# Patient Record
Sex: FEMALE | Race: BLACK OR AFRICAN AMERICAN | Employment: OTHER | URBAN - METROPOLITAN AREA
[De-identification: names, ages, dates, MRNs, and addresses within clinical notes are randomized per-mention and may not be internally consistent; named-entity substitution may affect disease eponyms.]

---

## 2017-01-04 ENCOUNTER — HOSPITAL ENCOUNTER (OUTPATIENT)
Dept: PHYSICAL THERAPY | Age: 62
Discharge: HOME | End: 2017-01-04
Payer: COMMERCIAL

## 2017-01-04 PROCEDURE — G8979 MOBILITY GOAL STATUS: HCPCS

## 2017-01-04 PROCEDURE — 97110 THERAPEUTIC EXERCISES: CPT

## 2017-01-04 PROCEDURE — G8980 MOBILITY D/C STATUS: HCPCS

## 2017-01-04 PROCEDURE — 97112 NEUROMUSCULAR REEDUCATION: CPT
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
## 2017-01-04 NOTE — PROGRESS NOTES
PT DAILY TREATMENT NOTE - [facility]     Patient Name: Tamia Parr  Date:2017  : 1955  [x]  Patient  Verified  Payor: BLUE CROSS / Plan: Lee Guillen [id number] PPO / Product Type: PPO /    In time:3:10  Out time:3:50  Total Treatment Time (min): 40  Total Timed Codes (min): 40  1:1 Treatment Time (MC only): 40   Visit #: 5 of 5    Treatment Area: Lumbar pain [M54.5]  Neck pain [M54.2]  Shoulder pain [M25.519]    SUBJECTIVE  Pain Level (0-10 scale): 0  Any medication changes, allergies to medications, adverse drug reactions, diagnosis change, or new procedure performed?: [x] No    [] Yes (see summary sheet for update)  Subjective functional status/changes:   [] No changes reported  Pt reports no current pain and that she is ready to finish up with PT today.      OBJECTIVE    Modality rationale:  to improve the patients ability to    Min Type Additional Details    [] Estim:  []Unatt       []IFC  []Premod                        []Other:  []w/ice   []w/heat  Position:  Location:    [] Estim: []Att    []TENS instruct  []NMES                    []Other:  []w/US   []w/ice   []w/heat  Position:  Location:    []  Traction: [] Cervical       []Lumbar                       [] Prone          []Supine                       []Intermittent   []Continuous Lbs:  [] before manual  [] after manual    []  Ultrasound: []Continuous   [] Pulsed                           []1MHz   []3MHz W/cm2:  Location:    []  Iontophoresis with dexamethasone         Location: [] Take home patch   [] In clinic    []  Ice     []  heat  []  Ice massage  []  Laser   []  Anodyne Position:  Location:    []  Laser with stim  []  Other:  Position:  Location:    []  Vasopneumatic Device Pressure:       [] lo [] med [] hi   Temperature: [] lo [] med [] hi   [] Skin assessment post-treatment:  []intact []redness- no adverse reaction    []redness  adverse reaction:      min []Eval                  []Re-Eval       30 min Therapeutic Exercise:  [x] See flow sheet :   Rationale: increase ROM and increase strength to improve the patients ability to improve activity tolerance      min Therapeutic Activity:  []  See flow sheet :   Rationale:   to improve the patients ability to      10 min Neuromuscular Re-education:  [x]  See flow sheet : core act and scap stab ex's   Rationale: increase strength and improve coordination  to improve the patients ability to improve activity tolerance     min Manual Therapy:     Rationale:  to      min Gait Training:  ___ feet with ___ device on level surfaces with ___ level of assist   Rationale: With   [] TE   [] TA   [] neuro   [] other: Patient Education: [x] Review HEP    [] Progressed/Changed HEP based on:   [] positioning   [] body mechanics   [] transfers   [] heat/ice application    [] other:      Other Objective/Functional Measures: see goals below     Pain Level (0-10 scale) post treatment: 0    ASSESSMENT/Changes in Function: D/C due to progress toward goals     []  See Plan of Care  []  See progress note/recertification  [x]  See Discharge Summary         Progress towards goals / Updated goals:  Long term goals: to be accomplished in 6 weeks  1) Pt will improve Back FOTO > or = 60 indicating improvements in function. At PN: Progressing, 44, an increase of 3. 10/13/16  Current: Goal met- Back FOTO = 66 (+25 since I.E.) (1/4/17)  2) Pt will improve C/S AROM B SB, B rot > or = 8-10 deg w/o pain for ease with driving. At PN: R SB 45 without pain, L SB 41 with slight pain, L rot 60 without pain, R rot 59 without pain.    Current:Goal met: Pt is WNL with B SB and B Rot with no increase in pain. (1/4/17)  3) Pt will improve L/S AROM flex, ext, B SB WFL w/o an increase in pain in order to perform ADL's. At PN: Flexion WNL with no pain, ext WNL without pain, B SB WNL with increase in pain to L SB, B Rot WNL.  10/17/16  Current: Goal met- L/S AROM flex, ext, B SB WNL w/o an increase in pain (1/4/17)    PLAN  [] Upgrade activities as tolerated     []  Continue plan of care  []  Update interventions per flow sheet       [x]  Discharge due to: Progress toward goals  []  Other:_      Yunier Randolph PT 1/4/2017  3:15 PM    Future Appointments  Date Time Provider Jody Lara   2/1/2017 10:15 AM Nia Parra  [address]

## 2017-02-01 ENCOUNTER — OFFICE VISIT (OUTPATIENT)
Dept: ORTHOPEDIC SURGERY | Age: 62
End: 2017-02-01

## 2017-02-01 VITALS
HEIGHT: 65 IN | DIASTOLIC BLOOD PRESSURE: 72 MMHG | TEMPERATURE: 97.8 F | OXYGEN SATURATION: 98 % | WEIGHT: 197.6 LBS | RESPIRATION RATE: 18 BRPM | SYSTOLIC BLOOD PRESSURE: 154 MMHG | HEART RATE: 102 BPM | BODY MASS INDEX: 32.92 KG/M2

## 2017-02-01 DIAGNOSIS — M48.02 CERVICAL STENOSIS OF SPINE: Primary | ICD-10-CM

## 2017-02-01 RX ORDER — BETAMETHASONE SODIUM PHOSPHATE AND BETAMETHASONE ACETATE 3; 3 MG/ML; MG/ML
12 INJECTION, SUSPENSION INTRA-ARTICULAR; INTRALESIONAL; INTRAMUSCULAR; SOFT TISSUE ONCE
Qty: 2 ML | Refills: 0
Start: 2017-02-01 | End: 2017-02-01

## 2017-02-01 RX ORDER — LOSARTAN POTASSIUM AND HYDROCHLOROTHIAZIDE 12.5; 1 MG/1; MG/1
TABLET ORAL
Refills: 5 | COMMUNITY
Start: 2017-01-01 | End: 2017-02-01

## 2017-02-01 RX ORDER — LIDOCAINE HYDROCHLORIDE 20 MG/ML
0.5 INJECTION, SOLUTION EPIDURAL; INFILTRATION; INTRACAUDAL; PERINEURAL ONCE
Qty: 0.5 ML | Refills: 0
Start: 2017-02-01 | End: 2017-02-01

## 2017-02-01 RX ORDER — HYDROCODONE BITARTRATE AND ACETAMINOPHEN 5; 325 MG/1; MG/1
TABLET ORAL
Refills: 0 | COMMUNITY
Start: 2017-01-25 | End: 2018-10-25 | Stop reason: ALTCHOICE

## 2017-02-01 RX ORDER — PREGABALIN 100 MG/1
100 CAPSULE ORAL 3 TIMES DAILY
Qty: 90 CAP | Refills: 2 | Status: SHIPPED | OUTPATIENT
Start: 2017-02-01

## 2017-02-01 RX ORDER — DEXLANSOPRAZOLE 30 MG/1
CAPSULE, DELAYED RELEASE ORAL
Refills: 12 | COMMUNITY
Start: 2016-12-01 | End: 2019-10-07

## 2017-02-01 RX ORDER — BUPIVACAINE HYDROCHLORIDE 2.5 MG/ML
0.5 INJECTION, SOLUTION INFILTRATION; PERINEURAL ONCE
Qty: 0.5 ML | Refills: 0
Start: 2017-02-01 | End: 2017-02-01

## 2017-02-01 RX ORDER — AZELASTINE HCL 205.5 UG/1
SPRAY NASAL
Refills: 12 | COMMUNITY
Start: 2016-10-27 | End: 2020-10-22 | Stop reason: ALTCHOICE

## 2017-02-01 RX ORDER — SODIUM CHLORIDE 0.9 G/100ML
IRRIGANT IRRIGATION
Refills: 0 | COMMUNITY
Start: 2016-11-11 | End: 2018-10-25 | Stop reason: ALTCHOICE

## 2017-02-01 RX ORDER — TRAMADOL HYDROCHLORIDE 50 MG/1
TABLET ORAL
Refills: 0 | COMMUNITY
Start: 2016-12-24 | End: 2020-10-22 | Stop reason: ALTCHOICE
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
## 2017-02-01 NOTE — PROGRESS NOTES
Venus Pierre entered per Inna Late as documented on blue sheet: MRI cervical w/o contrast,    TPI 2mL Celestone, 0.5mL Lidocaine, 0.5mL Marcaine. Left Lev.  Scap  Left upper Trap

## 2017-02-09 ENCOUNTER — HOSPITAL ENCOUNTER (OUTPATIENT)
Age: 62
Discharge: HOME | End: 2017-02-09
Attending: PHYSICAL MEDICINE & REHABILITATION
Payer: COMMERCIAL

## 2017-02-09 DIAGNOSIS — M48.02 CERVICAL STENOSIS OF SPINE: ICD-10-CM

## 2017-02-09 PROCEDURE — 72141 MRI NECK SPINE W/O DYE: CPT

## 2017-03-08 ENCOUNTER — OFFICE VISIT (OUTPATIENT)
Dept: ORTHOPEDIC SURGERY | Age: 62
End: 2017-03-08

## 2017-03-08 VITALS
SYSTOLIC BLOOD PRESSURE: 131 MMHG | HEART RATE: 85 BPM | HEIGHT: 65 IN | RESPIRATION RATE: 18 BRPM | BODY MASS INDEX: 32.82 KG/M2 | OXYGEN SATURATION: 97 % | WEIGHT: 197 LBS | TEMPERATURE: 98.7 F | DIASTOLIC BLOOD PRESSURE: 66 MMHG

## 2017-03-08 DIAGNOSIS — M48.02 CERVICAL STENOSIS OF SPINE: ICD-10-CM

## 2017-03-08 DIAGNOSIS — G95.9 CERVICAL MYELOPATHY (HCC): ICD-10-CM

## 2017-03-08 RX ORDER — TRAMADOL HYDROCHLORIDE 200 MG/1
200 TABLET, EXTENDED RELEASE ORAL DAILY
Qty: 30 TAB | Refills: 1 | Status: SHIPPED | OUTPATIENT
Start: 2017-03-08 | End: 2017-05-05 | Stop reason: SDUPTHER

## 2017-03-08 RX ORDER — CYCLOBENZAPRINE HCL 10 MG
10 TABLET ORAL
Qty: 90 TAB | Refills: 2 | Status: SHIPPED | OUTPATIENT
Start: 2017-03-08 | End: 2017-05-05 | Stop reason: SDUPTHER

## 2017-03-08 RX ORDER — POTASSIUM CHLORIDE 750 MG/1
TABLET, FILM COATED, EXTENDED RELEASE ORAL
Refills: 12 | COMMUNITY
Start: 2017-02-01 | End: 2018-10-25 | Stop reason: SDUPTHER

## 2017-03-08 RX ORDER — LOSARTAN POTASSIUM AND HYDROCHLOROTHIAZIDE 12.5; 1 MG/1; MG/1
TABLET ORAL
Refills: 5 | COMMUNITY
Start: 2017-02-01 | End: 2018-10-25 | Stop reason: SDUPTHER
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
## 2017-03-08 NOTE — MR AVS SNAPSHOT
Visit Information Date & Time Provider Department Dept. Phone Encounter #  
 3/8/2017 11:15 AM Albaro Dillon  [facility] [address] [facility] [phone] [id number] Follow-up Instructions Return for sx eval ACDF. Upcoming Health Maintenance Date Due Hepatitis C Screening 1955 DTaP/Tdap/Td series (1 - Tdap) 3/23/1976 PAP AKA CERVICAL CYTOLOGY 3/23/1976 BREAST CANCER SCRN MAMMOGRAM 3/23/2005 FOBT Q 1 YEAR AGE 50-75 3/23/2005 ZOSTER VACCINE AGE 60> 3/23/2015 INFLUENZA AGE 9 TO ADULT 8/1/2016 Allergies as of 3/8/2017  Review Complete On: 3/8/2017 By: Saad Lane LPN Severity Noted Reaction Type Reactions Latex, Natural Rubber  10/01/2013   Side Effect Rash Latex tape causes rash to area Celebrex [Celecoxib]  09/06/2012    Hives Iodine  09/06/2012    Hives Sulfa (Sulfonamide Antibiotics)  09/06/2012    Hives Current Immunizations  Never Reviewed No immunizations on file. Not reviewed this visit You Were Diagnosed With   
  
 Codes Comments Cervical stenosis of spine     ICD-10-CM: M48.02 
ICD-9-CM: 723.0 Cervical myelopathy     ICD-10-CM: G95.9 ICD-9-CM: 721.1 Vitals BP Pulse Temp Resp Height(growth percentile) Weight(growth percentile) 131/66 85 98.7 °F (37.1 °C) (Oral) 18 5' 5\" (1.651 m) 197 lb (89.4 kg) SpO2 BMI OB Status Smoking Status 97% 32.78 kg/m2 Postmenopausal Never Smoker BMI and BSA Data Body Mass Index Body Surface Area 32.78 kg/m 2 2.02 m 2 Preferred Pharmacy Pharmacy Name Phone [id number] [address] [phone] Your Updated Medication List  
  
   
This list is accurate as of: 3/8/17 12:46 PM.  Always use your most recent med list.  
  
  
  
  
 albuterol 90 mcg/actuation inhaler Commonly known as:  PROVENTIL HFA, VENTOLIN HFA, PROAIR HFA  
 Take 1 Puff by inhalation every four (4) hours as needed. Azelastine 0.15 % (205.5 mcg) nasal spray Commonly known as:  ASTEPRO  
  
 cetirizine 10 mg tablet Commonly known as:  ZYRTEC Take 10 mg by mouth daily. cloNIDine HCl 0.1 mg tablet Commonly known as:  CATAPRES Take 0.1 mg by mouth nightly. cyclobenzaprine 10 mg tablet Commonly known as:  FLEXERIL Take 1 Tab by mouth three (3) times daily as needed. * DEXILANT 60 mg Cpdb Generic drug:  Dexlansoprazole Take  by mouth daily. * DEXILANT 30 mg capsule Generic drug:  dexlansoprazole FARXIGA 5 mg Tab tablet Generic drug:  dapagliflozin Take 5 mg by mouth daily. HYDROcodone-acetaminophen 5-325 mg per tablet Commonly known as:  NORCO  
  
 * HYZAAR 100-25 mg per tablet Generic drug:  losartan-hydroCHLOROthiazide Take 1 Tab by mouth daily. * losartan-hydroCHLOROthiazide 100-12.5 mg per tablet Commonly known as:  HYZAAR  
  
 metFORMIN 1,000 mg tablet Commonly known as:  GLUCOPHAGE Take 1,000 mg by mouth two (2) times daily (with meals). montelukast 10 mg tablet Commonly known as:  SINGULAIR Take 10 mg by mouth daily. * potassium chloride SA 10 mEq capsule Commonly known as:  Dhara Nathan Take 10 mEq by mouth daily. * potassium chloride SR 10 mEq tablet Commonly known as:  KLOR-CON 10  
  
 pregabalin 100 mg capsule Commonly known as:  Dorothyann Kobs Take 1 Cap by mouth three (3) times daily. Max Daily Amount: 300 mg. Indications: FIBROMYALGIA  
  
 promethazine 12.5 mg tablet Commonly known as:  PHENERGAN Take 12.5 mg by mouth as needed. PROMETRIUM 100 mg capsule Generic drug:  progesterone Take 100 mg by mouth daily. sodium chloride irrigation 0.9 % irrigation SPIRIVA WITH HANDIHALER 18 mcg inhalation capsule Generic drug:  tiotropium Take 1 Cap by inhalation daily. topiramate 25 mg tablet Commonly known as:  TOPAMAX Take 1 Tab by mouth two (2) times a day. * traMADol 50 mg tablet Commonly known as:  ULTRAM  
  
 * traMADol 200 mg tablet Commonly known as:  ULTRAM-ER Take 1 Tab by mouth daily. Max Daily Amount: 200 mg. VITAMIN D2 50,000 unit capsule Generic drug:  ergocalciferol Take 50,000 Units by mouth every seven (7) days. Vytorin 10/40 10-40 mg per tablet Generic drug:  ezetimibe-simvastatin Take 1 Tab by mouth nightly. * Notice: This list has 8 medication(s) that are the same as other medications prescribed for you. Read the directions carefully, and ask your doctor or other care provider to review them with you. Prescriptions Printed Refills  
 traMADol (ULTRAM-ER) 200 mg tablet 1 Sig: Take 1 Tab by mouth daily. Max Daily Amount: 200 mg. Class: Print Route: Oral  
  
Prescriptions Sent to Pharmacy Refills  
 cyclobenzaprine (FLEXERIL) 10 mg tablet 2 Sig: Take 1 Tab by mouth three (3) times daily as needed. Class: Normal  
 [id number] [address] #: [phone] Route: Oral  
  
Follow-up Instructions Return for sx eval ACDF. Patient Instructions Linkdex Activation Thank you for requesting access to Linkdex. Please follow the instructions below to securely access and download your online medical record. Linkdex allows you to send messages to your doctor, view your test results, renew your prescriptions, schedule appointments, and more. How Do I Sign Up? 1. In your internet browser, go to [email or web address] 
2. Click on the First Time User? Click Here link in the Sign In box. You will be redirect to the New Member Sign Up page. 3. Enter your Linkdex Access Code exactly as it appears below. You will not need to use this code after youve completed the sign-up process. If you do not sign up before the expiration date, you must request a new code. LesConcierges Access Code: [facility] PAMELA Expires: 2017 11:20 AM (This is the date your LesConcierges access code will ) 4. Enter the last four digits of your Social Security Number (xxxx) and Date of Birth (mm/dd/yyyy) as indicated and click Submit. You will be taken to the next sign-up page. 5. Create a LesConcierges ID. This will be your LesConcierges login ID and cannot be changed, so think of one that is secure and easy to remember. 6. Create a LesConcierges password. You can change your password at any time. 7. Enter your Password Reset Question and Answer. This can be used at a later time if you forget your password. 8. Enter your e-mail address. You will receive e-mail notification when new information is available in [address] 9. Click Sign Up. You can now view and download portions of your medical record. 10. Click the Download Summary menu link to download a portable copy of your medical information. Additional Information If you have questions, please visit the Frequently Asked Questions section of the LesConcierges website at [email or web address] Remember, LesConcierges is NOT to be used for urgent needs. For medical emergencies, dial 911. Spinal Stenosis: Care Instructions Your Care Instructions Spinal stenosis is a narrowing of the canal that surrounds the spinal cord and nerve roots. The spine is made up of bones, or vertebrae. The spinal cord runs through an opening in the bones called the spinal canal. Sometimes, bone and ligament and disc tissue grow into this canal and press on the nerves that branch out from the spinal cord. This causes pain, numbness, or weaknessmost often in the arms, legs, feet, and rear end (buttocks). Spinal stenosis can happen as you age. It can occur in the lower back or the neck. You may be able to treat spinal stenosis with pain medicine and exercises to keep your spine strong and flexible.  Your doctor may suggest physical therapy. Some people try cortisone (corticosteroid) shots to reduce swelling. However, you may need surgery if your pain and numbness are so bad that you cannot do normal activities. Follow-up care is a key part of your treatment and safety. Be sure to make and go to all appointments, and call your doctor if you are having problems. It's also a good idea to know your test results and keep a list of the medicines you take. How can you care for yourself at home? · Ask your doctor if you can take an over-the-counter pain medicine, such as acetaminophen (Tylenol), ibuprofen (Advil, Motrin), or naproxen (Aleve). Be safe with medicines. Read and follow all instructions on the label. · Reach and stay at a healthy weight. Too much weight is hard on your spine. · Change positions when sitting to ease pain. For example, lean forward. This may reduce pressure on the spinal cord and its nerves. · Stretch your back muscles as your doctor or physical therapist recommends. Here are a few exercises to try if your doctor says it is okay. ¨ Lie on your back and gently pull one bent knee to your chest. Put that foot back on the floor and then pull the other knee to your chest. 
¨ Do pelvic tilts. Lie on your back with your knees bent. Tighten your stomach muscles. Pull your belly button (navel) in and up toward your ribs. You should feel like your back is pressing to the floor and your hips and pelvis are slightly lifting off the floor. Hold for 6 seconds while breathing smoothly. ¨ Press your back flat against a wall and slide down into a half squat. Hold for 6 seconds while breathing normally. When should you call for help? Call 911 anytime you think you may need emergency care. For example, call if: 
· You are unable to move a leg at all. Call your doctor now or seek immediate medical care if: 
· You have new or worse symptoms in your arms, legs, chest, belly, or buttocks. Symptoms may include: ¨ Numbness or tingling. ¨ Weakness. ¨ Pain. · You lose bladder or bowel control. Watch closely for changes in your health, and be sure to contact your doctor if: 
· You are not getting better as expected. Where can you learn more? Go to http://anaya-weston.info/. Enter V369 in the search box to learn more about \"Spinal Stenosis: Care Instructions. \" Current as of: May 23, 2016 Content Version: 11.1 © 6318-5201 [employer] My Life. Care instructions adapted under license by [employer] (which disclaims liability or warranty for this information). If you have questions about a medical condition or this instruction, always ask your healthcare professional. [address] any warranty or liability for your use of this information. Introducing [facility]! Shanika Jones introduces Rouxbe patient portal. Now you can access parts of your medical record, email your doctor's office, and request medication refills online. 1. In your internet browser, go to [email or web address] 2. Click on the First Time User? Click Here link in the Sign In box. You will see the New Member Sign Up page. 3. Enter your Rouxbe Access Code exactly as it appears below. You will not need to use this code after youve completed the sign-up process. If you do not sign up before the expiration date, you must request a new code. · Rouxbe Access Code: [facility] PAMELA Expires: 6/6/2017 11:20 AM 
 
4. Enter the last four digits of your Social Security Number (xxxx) and Date of Birth (mm/dd/yyyy) as indicated and click Submit. You will be taken to the next sign-up page. 5. Create a Penzatat ID. This will be your Rouxbe login ID and cannot be changed, so think of one that is secure and easy to remember. 6. Create a Rouxbe password. You can change your password at any time. 7. Enter your Password Reset Question and Answer.  This can be used at a later time if you forget your password. 8. Enter your e-mail address. You will receive e-mail notification when new information is available in [address] 9. Click Sign Up. You can now view and download portions of your medical record. 10. Click the Download Summary menu link to download a portable copy of your medical information. If you have questions, please visit the Frequently Asked Questions section of the Connequity website. Remember, Connequity is NOT to be used for urgent needs. For medical emergencies, dial 911. Now available from your iPhone and Android! Please provide this summary of care documentation to your next provider. Your primary care clinician is listed as Cody Castro. If you have any questions after today's visit, please call [phone].

## 2017-03-08 NOTE — PATIENT INSTRUCTIONS
S[employer]opStake Activation    Thank you for requesting access to [employer]. Please follow the instructions below to securely access and download your online medical record. Sc[employer]Stake allows you to send messages to your doctor, view your test results, renew your prescriptions, schedule appointments, and more. How Do I Sign Up? 1. In your internet browser, go to [email or web address]  2. Click on the First Time User? Click Here link in the Sign In box. You will be redirect to the New Member Sign Up page. 3. Enter your ScoopStake Access Code exactly as it appears below. You will not need to use this code after youve completed the sign-up process. If you do not sign up before the expiration date, you must request a new code. ScoopStake Access Code: [email or web address]  Expires: 2017 11:20 AM (This is the date your ScoopStake access code will )    4. Enter the last four digits of your Social Security Number (xxxx) and Date of Birth (mm/dd/yyyy) as indicated and click Submit. You will be taken to the next sign-up page. 5. Create a ScoopStake ID. This will be your ScoopStake login ID and cannot be changed, so think of one that is secure and easy to remember. 6. Create a Sc[employer]Stake password. You can change your password at any time. 7. Enter your Password Reset Question and Answer. This can be used at a later time if you forget your password. 8. Enter your e-mail address. You will receive e-mail notification when new information is available in [address]. 9. Click Sign Up. You can now view and download portions of your medical record. 10. Click the Download Summary menu link to download a portable copy of your medical information. Additional Information    If you have questions, please visit the Frequently Asked Questions section of the ScoopStake website at [email or web address] Remember, ScoopStake is NOT to be used for urgent needs. For medical emergencies, dial 911.          Spinal Stenosis: Care Instructions  Your Care Instructions    Spinal stenosis is a narrowing of the canal that surrounds the spinal cord and nerve roots. The spine is made up of bones, or vertebrae. The spinal cord runs through an opening in the bones called the spinal canal. Sometimes, bone and ligament and disc tissue grow into this canal and press on the nerves that branch out from the spinal cord. This causes pain, numbness, or weakness--most often in the arms, legs, feet, and rear end (buttocks). Spinal stenosis can happen as you age. It can occur in the lower back or the neck. You may be able to treat spinal stenosis with pain medicine and exercises to keep your spine strong and flexible. Your doctor may suggest physical therapy. Some people try cortisone (corticosteroid) shots to reduce swelling. However, you may need surgery if your pain and numbness are so bad that you cannot do normal activities. Follow-up care is a key part of your treatment and safety. Be sure to make and go to all appointments, and call your doctor if you are having problems. It's also a good idea to know your test results and keep a list of the medicines you take. How can you care for yourself at home? · Ask your doctor if you can take an over-the-counter pain medicine, such as acetaminophen (Tylenol), ibuprofen (Advil, Motrin), or naproxen (Aleve). Be safe with medicines. Read and follow all instructions on the label. · Reach and stay at a healthy weight. Too much weight is hard on your spine. · Change positions when sitting to ease pain. For example, lean forward. This may reduce pressure on the spinal cord and its nerves. · Stretch your back muscles as your doctor or physical therapist recommends. Here are a few exercises to try if your doctor says it is okay. ¨ Lie on your back and gently pull one bent knee to your chest. Put that foot back on the floor and then pull the other knee to your chest.  ¨ Do pelvic tilts.  Lie on your back with your knees bent. Tighten your stomach muscles. Pull your belly button (navel) in and up toward your ribs. You should feel like your back is pressing to the floor and your hips and pelvis are slightly lifting off the floor. Hold for 6 seconds while breathing smoothly. ¨ Press your back flat against a wall and slide down into a half squat. Hold for 6 seconds while breathing normally. When should you call for help? Call 911 anytime you think you may need emergency care. For example, call if:  · You are unable to move a leg at all. Call your doctor now or seek immediate medical care if:  · You have new or worse symptoms in your arms, legs, chest, belly, or buttocks. Symptoms may include:  ¨ Numbness or tingling. ¨ Weakness. ¨ Pain. · You lose bladder or bowel control. Watch closely for changes in your health, and be sure to contact your doctor if:  · You are not getting better as expected. Where can you learn more? Go to http://anaya-weston.info/. Enter V369 in the search box to learn more about \"Spinal Stenosis: Care Instructions. \"  Current as of: May 23, 2016  Content Version: 11.1  © 5685-2468 [employer]. Care instructions adapted under license by [employer] (which disclaims liability or warranty for this information). If you have questions about a medical condition or this instruction, always ask your healthcare professional. [place] [address] any warranty or liability for your use of this information.

## 2017-04-06 ENCOUNTER — HOSPITAL ENCOUNTER (OUTPATIENT)
Dept: LAB | Age: 62
Discharge: HOME | End: 2017-04-06
Payer: COMMERCIAL

## 2017-04-06 DIAGNOSIS — M20.12 ACQUIRED HALLUX VALGUS OF LEFT FOOT: ICD-10-CM

## 2017-04-06 DIAGNOSIS — Z01.818 PREOP EXAMINATION: ICD-10-CM

## 2017-04-06 DIAGNOSIS — M79.672 LEFT FOOT PAIN: ICD-10-CM

## 2017-04-06 LAB
ALBUMIN SERPL BCP-MCNC: 3.8 G/DL (ref 3.4–5)
ALBUMIN/GLOB SERPL: 1.2 {RATIO} (ref 0.8–1.7)
ALP SERPL-CCNC: 65 U/L (ref 45–117)
ALT SERPL-CCNC: 28 U/L (ref 13–56)
ANION GAP BLD CALC-SCNC: 6 MMOL/L (ref 3–18)
AST SERPL W P-5'-P-CCNC: 21 U/L (ref 15–37)
BILIRUB SERPL-MCNC: 0.1 MG/DL (ref 0.2–1)
BUN SERPL-MCNC: 13 MG/DL (ref 7–18)
BUN/CREAT SERPL: 18 (ref 12–20)
CALCIUM SERPL-MCNC: 9.6 MG/DL (ref 8.5–10.1)
CHLORIDE SERPL-SCNC: 102 MMOL/L (ref 100–108)
CO2 SERPL-SCNC: 31 MMOL/L (ref 21–32)
CREAT SERPL-MCNC: 0.72 MG/DL (ref 0.6–1.3)
ERYTHROCYTE [DISTWIDTH] IN BLOOD BY AUTOMATED COUNT: 13.8 % (ref 11.6–14.5)
GLOBULIN SER CALC-MCNC: 3.1 G/DL (ref 2–4)
GLUCOSE SERPL-MCNC: 108 MG/DL (ref 74–99)
HCT VFR BLD AUTO: 43.3 % (ref 35–45)
HGB BLD-MCNC: 14.1 G/DL (ref 12–16)
MCH RBC QN AUTO: 30.1 PG (ref 24–34)
MCHC RBC AUTO-ENTMCNC: 32.6 G/DL (ref 31–37)
MCV RBC AUTO: 92.3 FL (ref 74–97)
PLATELET # BLD AUTO: 158 K/UL (ref 135–420)
PMV BLD AUTO: 11.7 FL (ref 9.2–11.8)
POTASSIUM SERPL-SCNC: 4 MMOL/L (ref 3.5–5.5)
PROT SERPL-MCNC: 6.9 G/DL (ref 6.4–8.2)
RBC # BLD AUTO: 4.69 M/UL (ref 4.2–5.3)
SODIUM SERPL-SCNC: 139 MMOL/L (ref 136–145)
WBC # BLD AUTO: 6.7 K/UL (ref 4.6–13.2)

## 2017-04-06 PROCEDURE — 93005 ELECTROCARDIOGRAM TRACING: CPT

## 2017-04-07 LAB
ATRIAL RATE: 81 BPM
CALCULATED P AXIS, ECG09: 33 DEGREES
CALCULATED R AXIS, ECG10: 8 DEGREES
CALCULATED T AXIS, ECG11: 27 DEGREES
DIAGNOSIS, 93000: NORMAL
P-R INTERVAL, ECG05: 148 MS
Q-T INTERVAL, ECG07: 370 MS
QRS DURATION, ECG06: 74 MS
QTC CALCULATION (BEZET), ECG08: 429 MS
VENTRICULAR RATE, ECG03: 81 BPM

## 2017-04-11 ENCOUNTER — HOSPITAL ENCOUNTER (OUTPATIENT)
Dept: GENERAL RADIOLOGY | Age: 62
Discharge: HOME | End: 2017-04-11
Payer: COMMERCIAL

## 2017-04-11 DIAGNOSIS — R05.3 COUGH, PERSISTENT: ICD-10-CM

## 2017-04-11 PROCEDURE — 71020 XR CHEST PA LAT: CPT

## 2017-05-05 ENCOUNTER — OFFICE VISIT (OUTPATIENT)
Dept: ORTHOPEDIC SURGERY | Age: 62
End: 2017-05-05

## 2017-05-05 VITALS
WEIGHT: 197 LBS | TEMPERATURE: 98.1 F | HEART RATE: 91 BPM | BODY MASS INDEX: 32.82 KG/M2 | SYSTOLIC BLOOD PRESSURE: 150 MMHG | RESPIRATION RATE: 18 BRPM | DIASTOLIC BLOOD PRESSURE: 53 MMHG | HEIGHT: 65 IN | OXYGEN SATURATION: 95 %

## 2017-05-05 DIAGNOSIS — G95.9 CERVICAL MYELOPATHY (HCC): ICD-10-CM

## 2017-05-05 DIAGNOSIS — M48.02 CERVICAL STENOSIS OF SPINE: ICD-10-CM

## 2017-05-05 DIAGNOSIS — M48.02 CERVICAL STENOSIS OF SPINE: Primary | ICD-10-CM

## 2017-05-05 RX ORDER — HYDROCODONE POLISTIREX AND CHLORPHENIRAMINE POLISTIREX 10; 8 MG/5ML; MG/5ML
SUSPENSION, EXTENDED RELEASE ORAL
Refills: 0 | COMMUNITY
Start: 2017-03-21 | End: 2019-10-07

## 2017-05-05 RX ORDER — TRAMADOL HYDROCHLORIDE 200 MG/1
200 TABLET, EXTENDED RELEASE ORAL DAILY
Qty: 30 TAB | Refills: 1 | Status: SHIPPED | OUTPATIENT
Start: 2017-05-05 | End: 2017-07-13 | Stop reason: SDUPTHER

## 2017-05-05 RX ORDER — CYCLOBENZAPRINE HCL 10 MG
10 TABLET ORAL
Qty: 90 TAB | Refills: 2 | Status: SHIPPED | OUTPATIENT
Start: 2017-05-05 | End: 2017-07-21 | Stop reason: SDUPTHER

## 2017-05-05 RX ORDER — OXYCODONE AND ACETAMINOPHEN 5; 325 MG/1; MG/1
TABLET ORAL
Refills: 0 | COMMUNITY
Start: 2017-04-13 | End: 2020-10-22 | Stop reason: ALTCHOICE

## 2017-05-05 RX ORDER — PROMETHAZINE HYDROCHLORIDE 25 MG/1
TABLET ORAL
Refills: 0 | COMMUNITY
Start: 2017-04-13 | End: 2018-10-25 | Stop reason: ALTCHOICE

## 2017-05-05 RX ORDER — AMOXICILLIN AND CLAVULANATE POTASSIUM 875; 125 MG/1; MG/1
TABLET, FILM COATED ORAL
Refills: 0 | COMMUNITY
Start: 2017-03-21 | End: 2018-10-25 | Stop reason: ALTCHOICE
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
## 2017-05-05 NOTE — PATIENT INSTRUCTIONS
Neck Arthritis: Exercises  Your Care Instructions  Here are some examples of typical rehabilitation exercises for your condition. Start each exercise slowly. Ease off the exercise if you start to have pain. Your doctor or physical therapist will tell you when you can start these exercises and which ones will work best for you. How to do the exercises  Neck stretches to the side    1. This stretch works best if you keep your shoulder down as you lean away from it. To help you remember to do this, start by relaxing your shoulders and lightly holding on to your thighs or your chair. 2. Tilt your head toward your shoulder and hold for 15 to 30 seconds. Let the weight of your head stretch your muscles. 3. Repeat 2 to 4 times toward each shoulder. Chin tuck    1. Lie on the floor with a rolled-up towel under your neck. Your head should be touching the floor. 2. Slowly bring your chin toward your chest.  3. Hold for a count of 6, and then relax for up to 10 seconds. 4. Repeat 8 to 12 times. Active cervical rotation    1. Sit in a firm chair, or stand up straight. 2. Keeping your chin level, turn your head to the right, and hold for 15 to 30 seconds. 3. Turn your head to the left and hold for 15 to 30 seconds. 4. Repeat 2 to 4 times to each side. Shoulder blade squeeze    1. While standing, squeeze your shoulder blades together. 2. Do not raise your shoulders up as you are squeezing. 3. Hold for 6 seconds. 4. Repeat 8 to 12 times. Shoulder rolls    1. Sit comfortably with your feet shoulder-width apart. You can also do this exercise standing up. 2. Roll your shoulders up, then back, and then down in a smooth, circular motion. 3. Repeat 2 to 4 times. Follow-up care is a key part of your treatment and safety. Be sure to make and go to all appointments, and call your doctor if you are having problems. It's also a good idea to know your test results and keep a list of the medicines you take.   Where can you learn more? Go to http://anaya-weston.info/. Enter S514 in the search box to learn more about \"Neck Arthritis: Exercises. \"  Current as of: May 23, 2016  Content Version: 11.2  © 6905-2595 [employer]. Care instructions adapted under license by [employer] (which disclaims liability or warranty for this information). If you have questions about a medical condition or this instruction, always ask your healthcare professional. [address] any warranty or liability for your use of this information.

## 2017-05-05 NOTE — MR AVS SNAPSHOT
Visit Information Date & Time Provider Department Dept. Phone Encounter #  
 5/5/2017 10:00 AM Jasper Ahmadi MD [facility] [phone] [id number] Follow-up Instructions Return in about 2 months (around 7/5/2017). Follow-up and Disposition History Upcoming Health Maintenance Date Due Hepatitis C Screening 1955 DTaP/Tdap/Td series (1 - Tdap) 3/23/1976 PAP AKA CERVICAL CYTOLOGY 3/23/1976 BREAST CANCER SCRN MAMMOGRAM 3/23/2005 FOBT Q 1 YEAR AGE 50-75 3/23/2005 ZOSTER VACCINE AGE 60> 3/23/2015 INFLUENZA AGE 9 TO ADULT 8/1/2017 Allergies as of 5/5/2017  Review Complete On: 5/5/2017 By: Jasper Ahmadi MD  
  
 Severity Noted Reaction Type Reactions Latex, Natural Rubber  10/01/2013   Side Effect Rash Latex tape causes rash to area Celebrex [Celecoxib]  09/06/2012    Hives Iodine  09/06/2012    Hives Sulfa (Sulfonamide Antibiotics)  09/06/2012    Hives Current Immunizations  Never Reviewed No immunizations on file. Not reviewed this visit You Were Diagnosed With   
  
 Codes Comments Cervical stenosis of spine    -  Primary ICD-10-CM: M48.02 
ICD-9-CM: 723.0 Cervical myelopathy (HCC)     ICD-10-CM: G95.9 ICD-9-CM: 721.1 Vitals BP Pulse Temp Resp Height(growth percentile) Weight(growth percentile) 150/53 91 98.1 °F (36.7 °C) (Oral) 18 5' 5\" (1.651 m) 197 lb (89.4 kg) SpO2 BMI OB Status Smoking Status 95% 32.78 kg/m2 Postmenopausal Never Smoker BMI and BSA Data Body Mass Index Body Surface Area 32.78 kg/m 2 2.02 m 2 Preferred Pharmacy Pharmacy Name Phone [id number] - [address] [phone] Your Updated Medication List  
  
   
This list is accurate as of: 5/5/17 11:43 AM.  Always use your most recent med list.  
  
  
  
  
 albuterol 90 mcg/actuation inhaler Commonly known as:  PROVENTIL HFA, VENTOLIN HFA, PROAIR HFA Take 1 Puff by inhalation every four (4) hours as needed. amoxicillin-clavulanate 875-125 mg per tablet Commonly known as:  AUGMENTIN Azelastine 0.15 % (205.5 mcg) nasal spray Commonly known as:  ASTEPRO  
  
 cetirizine 10 mg tablet Commonly known as:  ZYRTEC Take 10 mg by mouth daily. chlorpheniramine-HYDROcodone 10-8 mg/5 mL suspension Commonly known as:  TUSSIONEX  
  
 cloNIDine HCl 0.1 mg tablet Commonly known as:  CATAPRES Take 0.1 mg by mouth nightly. cyclobenzaprine 10 mg tablet Commonly known as:  FLEXERIL Take 1 Tab by mouth three (3) times daily as needed. * DEXILANT 60 mg Cpdb Generic drug:  Dexlansoprazole Take  by mouth daily. * DEXILANT 30 mg capsule Generic drug:  dexlansoprazole FARXIGA 5 mg Tab tablet Generic drug:  dapagliflozin Take 5 mg by mouth daily. HYDROcodone-acetaminophen 5-325 mg per tablet Commonly known as:  NORCO  
  
 * HYZAAR 100-25 mg per tablet Generic drug:  losartan-hydroCHLOROthiazide Take 1 Tab by mouth daily. * losartan-hydroCHLOROthiazide 100-12.5 mg per tablet Commonly known as:  HYZAAR  
  
 metFORMIN 1,000 mg tablet Commonly known as:  GLUCOPHAGE Take 1,000 mg by mouth two (2) times daily (with meals). montelukast 10 mg tablet Commonly known as:  SINGULAIR Take 10 mg by mouth daily. oxyCODONE-acetaminophen 5-325 mg per tablet Commonly known as:  PERCOCET * potassium chloride SA 10 mEq capsule Commonly known as:  Zeina Spark Take 10 mEq by mouth daily. * potassium chloride SR 10 mEq tablet Commonly known as:  KLOR-CON 10  
  
 pregabalin 100 mg capsule Commonly known as:  Ping Million Take 1 Cap by mouth three (3) times daily. Max Daily Amount: 300 mg. Indications: FIBROMYALGIA * promethazine 12.5 mg tablet Commonly known as:  PHENERGAN Take 12.5 mg by mouth as needed. * promethazine 25 mg tablet Commonly known as:  PHENERGAN  
  
 PROMETRIUM 100 mg capsule Generic drug:  progesterone Take 100 mg by mouth daily. sodium chloride irrigation 0.9 % irrigation SPIRIVA WITH HANDIHALER 18 mcg inhalation capsule Generic drug:  tiotropium Take 1 Cap by inhalation daily. topiramate 25 mg tablet Commonly known as:  TOPAMAX Take 1 Tab by mouth two (2) times a day. * traMADol 50 mg tablet Commonly known as:  ULTRAM  
  
 * traMADol 200 mg tablet Commonly known as:  ULTRAM-ER Take 1 Tab by mouth daily. Max Daily Amount: 200 mg. VITAMIN D2 50,000 unit capsule Generic drug:  ergocalciferol Take 50,000 Units by mouth every seven (7) days. Vytorin 10/40 10-40 mg per tablet Generic drug:  ezetimibe-simvastatin Take 1 Tab by mouth nightly. * Notice: This list has 10 medication(s) that are the same as other medications prescribed for you. Read the directions carefully, and ask your doctor or other care provider to review them with you. Prescriptions Printed Refills  
 traMADol (ULTRAM-ER) 200 mg tablet 1 Sig: Take 1 Tab by mouth daily. Max Daily Amount: 200 mg. Class: Print Route: Oral  
  
Prescriptions Sent to Pharmacy Refills  
 cyclobenzaprine (FLEXERIL) 10 mg tablet 2 Sig: Take 1 Tab by mouth three (3) times daily as needed. Class: Normal  
 Pharmacy: [facility][id number] - [place] #: [phone] Route: Oral  
  
Follow-up Instructions Return in about 2 months (around 7/5/2017). Patient Instructions Neck Arthritis: Exercises Your Care Instructions Here are some examples of typical rehabilitation exercises for your condition. Start each exercise slowly. Ease off the exercise if you start to have pain. Your doctor or physical therapist will tell you when you can start these exercises and which ones will work best for you. How to do the exercises Neck stretches to the side 1. This stretch works best if you keep your shoulder down as you lean away from it. To help you remember to do this, start by relaxing your shoulders and lightly holding on to your thighs or your chair. 2. Tilt your head toward your shoulder and hold for 15 to 30 seconds. Let the weight of your head stretch your muscles. 3. Repeat 2 to 4 times toward each shoulder. Chin tuck 1. Lie on the floor with a rolled-up towel under your neck. Your head should be touching the floor. 2. Slowly bring your chin toward your chest. 
3. Hold for a count of 6, and then relax for up to 10 seconds. 4. Repeat 8 to 12 times. Active cervical rotation 1. Sit in a firm chair, or stand up straight. 2. Keeping your chin level, turn your head to the right, and hold for 15 to 30 seconds. 3. Turn your head to the left and hold for 15 to 30 seconds. 4. Repeat 2 to 4 times to each side. Shoulder blade squeeze 1. While standing, squeeze your shoulder blades together. 2. Do not raise your shoulders up as you are squeezing. 3. Hold for 6 seconds. 4. Repeat 8 to 12 times. Shoulder rolls 1. Sit comfortably with your feet shoulder-width apart. You can also do this exercise standing up. 2. Roll your shoulders up, then back, and then down in a smooth, circular motion. 3. Repeat 2 to 4 times. Follow-up care is a key part of your treatment and safety. Be sure to make and go to all appointments, and call your doctor if you are having problems. It's also a good idea to know your test results and keep a list of the medicines you take. Where can you learn more? Go to http://anaya-weston.info/. Enter T095 in the search box to learn more about \"Neck Arthritis: Exercises. \" Current as of: May 23, 2016 Content Version: 11.2 © 4151-1735 [employer].  Care instructions adapted under license by [address] (which disclaims liability or warranty for this information). If you have questions about a medical condition or this instruction, always ask your healthcare professional. [address] any warranty or liability for your use of this information. Introducing [facility]! Monse Cordoba introduces [employer] patient portal. Now you can access parts of your medical record, email your doctor's office, and request medication refills online. 1. In your internet browser, go to [email or web address] 2. Click on the First Time User? Click Here link in the Sign In box. You will see the New Member Sign Up page. 3. Enter your [employer] Access Code exactly as it appears below. You will not need to use this code after youve completed the sign-up process. If you do not sign up before the expiration date, you must request a new code. · [employer] Access Code: [facility] Expires: 6/6/2017 12:20 PM 
 
4. Enter the last four digits of your Social Security Number (xxxx) and Date of Birth (mm/dd/yyyy) as indicated and click Submit. You will be taken to the next sign-up page. 5. Create a [employer] ID. This will be your [employer] login ID and cannot be changed, so think of one that is secure and easy to remember. 6. Create a [employer] password. You can change your password at any time. 7. Enter your Password Reset Question and Answer. This can be used at a later time if you forget your password. 8. Enter your e-mail address. You will receive e-mail notification when new information is available in [address] 9. Click Sign Up. You can now view and download portions of your medical record. 10. Click the Download Summary menu link to download a portable copy of your medical information. If you have questions, please visit the Frequently Asked Questions section of the [employer] website.  Remember, [employer] is NOT to be used for urgent needs. For medical emergencies, dial 911. Now available from your iPhone and Android! Please provide this summary of care documentation to your next provider. Your primary care clinician is listed as Jerome Burgos. If you have any questions after today's visit, please call [phone].

## 2017-07-13 DIAGNOSIS — M48.02 CERVICAL STENOSIS OF SPINE: ICD-10-CM

## 2017-07-13 DIAGNOSIS — G95.9 CERVICAL MYELOPATHY (HCC): ICD-10-CM

## 2017-07-13 RX ORDER — TRAMADOL HYDROCHLORIDE 200 MG/1
200 TABLET, EXTENDED RELEASE ORAL DAILY
Qty: 30 TAB | Refills: 1 | OUTPATIENT
Start: 2017-07-13 | End: 2017-07-21 | Stop reason: SDUPTHER

## 2017-07-13 NOTE — TELEPHONE ENCOUNTER
PHONE IN RX    Last Visit: 05/05/2017 with MD Eros Ledesma    Next Appointment: 07/21/2017 with MD Eros Ledesma   Previous Refill Encounters: 05/05/2017 per MD Eros Ledesma #[id number] with 1 refill     Requested Prescriptions     Pending Prescriptions Disp Refills    traMADol (ULTRAM-ER) 200 mg tablet 30 Tab 1     Sig: Take 1 Tab by mouth daily. Max Daily Amount: 200 mg.

## 2017-07-14 NOTE — TELEPHONE ENCOUNTER
Med phoned into pharmacy on file, spoke with Pharmacist Emeterio, marielena Macias. Called patient to inform, reached voicemail identified as patient, left message, identified myself/facility/call back number, requested return call to facility. If patient returns call, please inform of above. No further action required at this time.

## 2017-07-21 ENCOUNTER — OFFICE VISIT (OUTPATIENT)
Dept: ORTHOPEDIC SURGERY | Age: 62
End: 2017-07-21

## 2017-07-21 VITALS
WEIGHT: 186 LBS | OXYGEN SATURATION: 98 % | BODY MASS INDEX: 30.99 KG/M2 | TEMPERATURE: 97.6 F | SYSTOLIC BLOOD PRESSURE: 142 MMHG | HEART RATE: 77 BPM | DIASTOLIC BLOOD PRESSURE: 82 MMHG | RESPIRATION RATE: 18 BRPM | HEIGHT: 65 IN

## 2017-07-21 DIAGNOSIS — G95.9 CERVICAL MYELOPATHY (HCC): ICD-10-CM

## 2017-07-21 DIAGNOSIS — M48.02 CERVICAL STENOSIS OF SPINE: Primary | ICD-10-CM

## 2017-07-21 DIAGNOSIS — M48.02 CERVICAL STENOSIS OF SPINE: ICD-10-CM

## 2017-07-21 RX ORDER — TRAMADOL HYDROCHLORIDE 200 MG/1
200 TABLET, EXTENDED RELEASE ORAL DAILY
Qty: 30 TAB | Refills: 1 | Status: SHIPPED | OUTPATIENT
Start: 2017-07-21 | End: 2018-10-25 | Stop reason: SDUPTHER

## 2017-07-21 RX ORDER — POTASSIUM CHLORIDE 750 MG/1
TABLET, EXTENDED RELEASE ORAL
Refills: 2 | COMMUNITY
Start: 2017-06-16 | End: 2020-10-22 | Stop reason: SDUPTHER

## 2017-07-21 RX ORDER — CYCLOBENZAPRINE HCL 10 MG
10 TABLET ORAL
Qty: 90 TAB | Refills: 2 | Status: SHIPPED | OUTPATIENT
Start: 2017-07-21 | End: 2020-10-23

## 2017-07-21 NOTE — PROGRESS NOTES
[address], [address]  [facility], [address]  Phone: [phone]  Fax: [phone]  PROGRESS NOTE  Patient: Shakira Fletcher                MRN: [id number]       SSN: [id number]  YOB: 1955        AGE: 58 y.o. SEX: female  Body mass index is 30.95 kg/(m^2). PCP: Caitlyn Pitt MD  07/21/17    Chief Complaint   Patient presents with    Back Pain     2 month follow up    Neck Pain       HISTORY OF PRESENT ILLNESS, RADIOGRAPHS, and PLAN:     HISTORY:  Ms. Bernabe Mays returns today. She is continuing to have neck pain and loss of hand dexterity. She is dropping things, as well as balance issues. She has known cervical stenosis at C3-4. She had been previously scheduled for surgery that had been cancelled because of sinusitis. For a period of time, she was lost to followup and had other surgery done on her feet that delayed her for further surgery. She comes in today. Her bunion surgery is healed. She has complaints of neck pain with radiating arm pain, numbness, tingling, and loss of dexterity. I previously reviewed her MRI of her cervical spine demonstrating C3-4 stenosis. PHYSICAL EXAMINATION:  Her exam today is unchanged with antalgic range of motion of her neck, poor gait, but good strength and sensation. She has no long tract signs. ASSESSMENT/PLAN: I discussed the nature of surgery, which would be a cervical decompression and fusion at C3-4. I explained to her that she has polyarticular arthritis with low back pain and foot and ankle and leg pain that has nothing to do with this neck syndrome. I discussed the nature of surgery, the risks, benefits, complications, and alternatives to surgery. The patient wishes to proceed. We will proceed with a cervical discectomy and fusion at C3-4 once the appropriate approvals and clearances take place.          Past Medical History:   Diagnosis Date    Anemia     Arthritis     Asthma     Chronic pain     chronic back pain    Chronic sinusitis     Diabetes (HCC)     Fibromyalgia     GERD (gastroesophageal reflux disease)     Hepatitis B 10 years ago    Hypertension     Unspecified sleep apnea     uses cpap sometimes       Family History   Problem Relation Age of Onset    No Known Problems Mother     No Known Problems Father        Current Outpatient Prescriptions   Medication Sig Dispense Refill    KLOR-CON M10 10 mEq tablet   2    traMADol (ULTRAM-ER) 200 mg tablet Take 1 Tab by mouth daily. Max Daily Amount: 200 mg. 30 Tab 1    promethazine (PHENERGAN) 25 mg tablet   0    cyclobenzaprine (FLEXERIL) 10 mg tablet Take 1 Tab by mouth three (3) times daily as needed. 90 Tab 2    potassium chloride SR (KLOR-CON 10) 10 mEq tablet   12    DEXILANT 30 mg capsule   12    pregabalin (LYRICA) 100 mg capsule Take 1 Cap by mouth three (3) times daily. Max Daily Amount: 300 mg. Indications: FIBROMYALGIA 90 Cap 2    topiramate (TOPAMAX) 25 mg tablet Take 1 Tab by mouth two (2) times a day. 60 Tab 1    cetirizine (ZYRTEC) 10 mg tablet Take 10 mg by mouth daily. 5    metFORMIN (GLUCOPHAGE) 1,000 mg tablet Take 1,000 mg by mouth two (2) times daily (with meals). 12    montelukast (SINGULAIR) 10 mg tablet Take 10 mg by mouth daily. 4    promethazine (PHENERGAN) 12.5 mg tablet Take 12.5 mg by mouth as needed. 0    tiotropium (SPIRIVA WITH HANDIHALER) 18 mcg inhalation capsule Take 1 Cap by inhalation daily.  cloNIDine (CATAPRES) 0.1 mg tablet Take 0.1 mg by mouth nightly.  losartan-hydrochlorothiazide (HYZAAR) 100-25 mg per tablet Take 1 Tab by mouth daily.  potassium chloride SA (MICRO-K) 10 mEq capsule Take 10 mEq by mouth daily.  albuterol (PROVENTIL HFA, VENTOLIN HFA) 90 mcg/actuation inhaler Take 1 Puff by inhalation every four (4) hours as needed.  ergocalciferol (VITAMIN D2) 50,000 unit capsule Take 50,000 Units by mouth every seven (7) days.  ezetimibe-simvastatin (VYTORIN 10/40) 10-40 mg per tablet Take 1 Tab by mouth nightly.  amoxicillin-clavulanate (AUGMENTIN) 875-125 mg per tablet   0    chlorpheniramine-HYDROcodone (TUSSIONEX) 10-8 mg/5 mL suspension   0    oxyCODONE-acetaminophen (PERCOCET) 5-325 mg per tablet   0    losartan-hydroCHLOROthiazide (HYZAAR) 100-12.5 mg per tablet   5    Azelastine (ASTEPRO) 0.15 % (205.5 mcg) nasal spray   12    HYDROcodone-acetaminophen (NORCO) 5-325 mg per tablet   0    sodium chloride irrigation 0.9 % irrigation   0    traMADol (ULTRAM) 50 mg tablet   0    FARXIGA 5 mg tab tablet Take 5 mg by mouth daily. 5    Dexlansoprazole (DEXILANT) 60 mg CpDM Take  by mouth daily.  progesterone (PROMETRIUM) 100 mg capsule Take 100 mg by mouth daily.            Allergies   Allergen Reactions    Latex, Natural Rubber Rash     Latex tape causes rash to area    Celebrex [Celecoxib] Hives    Iodine Hives    Sulfa (Sulfonamide Antibiotics) Hives       Past Surgical History:   Procedure Laterality Date    HX BREAST LUMPECTOMY  2011    right breast    HX  SECTION      HX CHOLECYSTECTOMY      HX GYN      tubal ligation    HX ORTHOPAEDIC      right knee replacement    HX ORTHOPAEDIC Left     carpal tunnel       Past Medical History:   Diagnosis Date    Anemia     Arthritis     Asthma     Chronic pain     chronic back pain    Chronic sinusitis     Diabetes (HCC)     Fibromyalgia     GERD (gastroesophageal reflux disease)     Hepatitis B 10 years ago    Hypertension     Unspecified sleep apnea     uses cpap sometimes       Social History     Social History    Marital status:      Spouse name: N/A    Number of children: N/A    Years of education: N/A     Occupational History    disability      since      Social History Main Topics    Smoking status: Never Smoker    Smokeless tobacco: Never Used    Alcohol use No    Drug use: No    Sexual activity: Not on file Other Topics Concern    Not on file     Social History Narrative         REVIEW OF SYSTEMS:   CONSTITUTIONAL SYMPTOMS:  Negative. EYES:  Negative. EARS, NOSE, THROAT AND MOUTH:  Negative. CARDIOVASCULAR:  Negative. RESPIRATORY:  Negative. GENITOURINARY: Negative. GASTROINTESTINAL:  Negative. INTEGUMENTARY (SKIN AND/OR BREAST):  Negative. MUSCULOSKELETAL: Per HPI.   ENDOCRINE/RHEUMATOLOGIC:  Negative. NEUROLOGICAL:  Per HPI. HEMATOLOGIC/LYMPHATIC:  Negative. ALLERGIC/IMMUNOLOGIC:  Negative. PSYCHIATRIC:  Negative. PHYSICAL EXAMINATION:   Visit Vitals    /82    Pulse 77    Temp 97.6 °F (36.4 °C) (Oral)    Resp 18    Ht 5' 5\" (1.651 m)    Wt 186 lb (84.4 kg)    SpO2 98%    BMI 30.95 kg/m2    PAIN SCALE: 7/10    CONSTITUTIONAL: The patient is in no apparent distress and is alert and oriented x 3. NEUROLOGICAL: Alert and oriented x 3. Ambulation without assistive device. FWB. EXTREMITIES: See musculoskeletal.  MUSCULOSKELETAL:   Head and Neck: Neck pain. Negative for misalignment, asymmetry, crepitation, defects, tenderness masses or effusions.  Left Upper Extremity: Weakness. Paresthesia in hands. Inspection, percussion and palpation preformed. Naranjos sign is negative.  Right Upper Extremity: Paresthesia in hands. Inspection, percussion and palpation preformed. Naranjos sign is negative.  Spine, Ribs and Pelvis: Inspection, percussion and palpation preformed. Negative for misalignment, asymmetry, crepitation, defects, tenderness masses or effusions.  Left Lower Extremity: Inspection, percussion and palpation preformed. Negative straight leg raise.  Right Lower Extremity: Inspection, percussion and palpation preformed. Negative straight leg raise. SPINE EXAM:     Cervical spine: Neck is midline. Normal muscle tone. No focal atrophy is noted. ASSESSMENT    ICD-10-CM ICD-9-CM    1. Cervical stenosis of spine M48.02 723.0    2.  Cervical myelopathy ([facility] G95.9 721.1        Written by Huber Rockwell, as dictated by Jamshid Fernandez MD.    I, Dr. Jamshid Fernandez MD, confirm that all documentation is accurate.

## 2017-07-21 NOTE — PROGRESS NOTES
[address] [facility]   Pre-Surgical Worksheet    Patient: Cassie Castellano                         MRN: [id number]     Age:  58 y.o.,      Sex: female    YOB: 1955           KEMI: July 21, 2017  PCP: Pato Stephenson MD    Allergies   Allergen Reactions    Latex, Natural Rubber Rash     Latex tape causes rash to area    Celebrex [Celecoxib] Hives    Iodine Hives    Sulfa (Sulfonamide Antibiotics) Hives         ICD-10-CM ICD-9-CM    1. Cervical stenosis of spine M48.02 723.0    2. Cervical myelopathy (HCC) G95.9 721.1        Surgery: ACDF C3/4. Pain Assessment   Pain Assessment  7/21/2017   Location of Pain Back;Hand   Location Modifiers Left;Right   Severity of Pain 7   Quality of Pain Aching   Quality of Pain Comment -   Duration of Pain -   Frequency of Pain Constant   Date Pain First Started -   Aggravating Factors Walking;Stairs;Standing;Bending;Kneeling   Aggravating Factors Comment -   Limiting Behavior -   Relieving Factors Nothing   Relieving Factors Comment -   Result of Injury -   Work-Related Injury -   How long out of work? -   Type of Injury -       Visit Vitals    /82    Pulse 77    Temp 97.6 °F (36.4 °C) (Oral)    Resp 18    Ht 5' 5\" (1.651 m)    Wt 186 lb (84.4 kg)    SpO2 98%    BMI 30.95 kg/m2       ADL Limits: Patient states she needs assistance to stand, sometimes with getting dressed, reaching or lifting things. Spine Surgery?: No:  When ? [facility]? .    Spinal Injections?: Yes  When ? [facility]? .    Physical Therapy?: Yes  When ? [facility] Where? .    NSAID's?: Yes    Pain Medications?: Yes  Type: Tramadol, Lyrica. In Pain Management: NO, Where: ?    Current Outpatient Prescriptions   Medication Sig    KLOR-CON M10 10 mEq tablet     traMADol (ULTRAM-ER) 200 mg tablet Take 1 Tab by mouth daily. Max Daily Amount: 200 mg.     promethazine (PHENERGAN) 25 mg tablet     cyclobenzaprine (FLEXERIL) 10 mg tablet Take 1 Tab by mouth three (3) times daily as needed.  potassium chloride SR (KLOR-CON 10) 10 mEq tablet     DEXILANT 30 mg capsule     pregabalin (LYRICA) 100 mg capsule Take 1 Cap by mouth three (3) times daily. Max Daily Amount: 300 mg. Indications: FIBROMYALGIA    topiramate (TOPAMAX) 25 mg tablet Take 1 Tab by mouth two (2) times a day.  cetirizine (ZYRTEC) 10 mg tablet Take 10 mg by mouth daily.  metFORMIN (GLUCOPHAGE) 1,000 mg tablet Take 1,000 mg by mouth two (2) times daily (with meals).  montelukast (SINGULAIR) 10 mg tablet Take 10 mg by mouth daily.  promethazine (PHENERGAN) 12.5 mg tablet Take 12.5 mg by mouth as needed.  tiotropium (SPIRIVA WITH HANDIHALER) 18 mcg inhalation capsule Take 1 Cap by inhalation daily.  cloNIDine (CATAPRES) 0.1 mg tablet Take 0.1 mg by mouth nightly.  losartan-hydrochlorothiazide (HYZAAR) 100-25 mg per tablet Take 1 Tab by mouth daily.  potassium chloride SA (MICRO-K) 10 mEq capsule Take 10 mEq by mouth daily.  albuterol (PROVENTIL HFA, VENTOLIN HFA) 90 mcg/actuation inhaler Take 1 Puff by inhalation every four (4) hours as needed.  ergocalciferol (VITAMIN D2) 50,000 unit capsule Take 50,000 Units by mouth every seven (7) days.  ezetimibe-simvastatin (VYTORIN 10/40) 10-40 mg per tablet Take 1 Tab by mouth nightly.  amoxicillin-clavulanate (AUGMENTIN) 875-125 mg per tablet     chlorpheniramine-HYDROcodone (TUSSIONEX) 10-8 mg/5 mL suspension     oxyCODONE-acetaminophen (PERCOCET) 5-325 mg per tablet     losartan-hydroCHLOROthiazide (HYZAAR) 100-12.5 mg per tablet     Azelastine (ASTEPRO) 0.15 % (205.5 mcg) nasal spray     HYDROcodone-acetaminophen (NORCO) 5-325 mg per tablet     sodium chloride irrigation 0.9 % irrigation     traMADol (ULTRAM) 50 mg tablet     FARXIGA 5 mg tab tablet Take 5 mg by mouth daily.  Dexlansoprazole (DEXILANT) 60 mg CpDM Take  by mouth daily.  progesterone (PROMETRIUM) 100 mg capsule Take 100 mg by mouth daily.        No current facility-administered medications for this visit.         Past Medical History:   Diagnosis Date    Anemia     Arthritis     Asthma     Chronic pain     chronic back pain    Chronic sinusitis     Diabetes (HCC)     Fibromyalgia     GERD (gastroesophageal reflux disease)     Hepatitis B 10 years ago    Hypertension     Unspecified sleep apnea     uses cpap sometimes       Past Surgical History:   Procedure Laterality Date    HX BREAST LUMPECTOMY  2011    right breast    HX  SECTION      HX CHOLECYSTECTOMY      HX GYN      tubal ligation    HX ORTHOPAEDIC      right knee replacement    HX ORTHOPAEDIC Left     carpal tunnel       Social History     Social History    Marital status:      Spouse name: N/A    Number of children: N/A    Years of education: N/A     Occupational History    disability      since      Social History Main Topics    Smoking status: Never Smoker    Smokeless tobacco: Never Used    Alcohol use No    Drug use: No    Sexual activity: Not Asked     Other Topics Concern    None     Social History Narrative

## 2017-08-23 ENCOUNTER — HOSPITAL ENCOUNTER (OUTPATIENT)
Dept: GENERAL RADIOLOGY | Age: 62
Discharge: HOME | End: 2017-08-23
Payer: COMMERCIAL

## 2017-08-23 ENCOUNTER — TELEPHONE (OUTPATIENT)
Dept: ORTHOPEDIC SURGERY | Age: 62
End: 2017-08-23

## 2017-08-23 ENCOUNTER — HOSPITAL ENCOUNTER (OUTPATIENT)
Dept: PREADMISSION TESTING | Age: 62
Discharge: HOME | End: 2017-08-23
Payer: COMMERCIAL

## 2017-08-23 DIAGNOSIS — Z01.818 PRE-OP EVALUATION: ICD-10-CM

## 2017-08-23 DIAGNOSIS — R01.1 UNDIAGNOSED CARDIAC MURMURS: ICD-10-CM

## 2017-08-23 LAB
ALBUMIN SERPL-MCNC: 4.1 G/DL (ref 3.4–5)
ALBUMIN/GLOB SERPL: 1.1 {RATIO} (ref 0.8–1.7)
ALP SERPL-CCNC: 85 U/L (ref 45–117)
ALT SERPL-CCNC: 18 U/L (ref 13–56)
ANION GAP SERPL CALC-SCNC: 7 MMOL/L (ref 3–18)
AST SERPL-CCNC: 12 U/L (ref 15–37)
ATRIAL RATE: 81 BPM
BASOPHILS # BLD: 0 K/UL (ref 0–0.06)
BASOPHILS NFR BLD: 1 % (ref 0–2)
BILIRUB SERPL-MCNC: 0.1 MG/DL (ref 0.2–1)
BUN SERPL-MCNC: 14 MG/DL (ref 7–18)
BUN/CREAT SERPL: 15 (ref 12–20)
CALCIUM SERPL-MCNC: 9.7 MG/DL (ref 8.5–10.1)
CALCULATED P AXIS, ECG09: 31 DEGREES
CALCULATED R AXIS, ECG10: 5 DEGREES
CALCULATED T AXIS, ECG11: 35 DEGREES
CHLORIDE SERPL-SCNC: 100 MMOL/L (ref 100–108)
CO2 SERPL-SCNC: 29 MMOL/L (ref 21–32)
CREAT SERPL-MCNC: 0.93 MG/DL (ref 0.6–1.3)
DIAGNOSIS, 93000: NORMAL
DIFFERENTIAL METHOD BLD: ABNORMAL
EOSINOPHIL # BLD: 0.2 K/UL (ref 0–0.4)
EOSINOPHIL NFR BLD: 3 % (ref 0–5)
ERYTHROCYTE [DISTWIDTH] IN BLOOD BY AUTOMATED COUNT: 14.9 % (ref 11.6–14.5)
GLOBULIN SER CALC-MCNC: 3.6 G/DL (ref 2–4)
GLUCOSE SERPL-MCNC: 167 MG/DL (ref 74–99)
HCT VFR BLD AUTO: 45.9 % (ref 35–45)
HGB BLD-MCNC: 14.9 G/DL (ref 12–16)
LYMPHOCYTES # BLD: 2.3 K/UL (ref 0.9–3.6)
LYMPHOCYTES NFR BLD: 40 % (ref 21–52)
MCH RBC QN AUTO: 29.4 PG (ref 24–34)
MCHC RBC AUTO-ENTMCNC: 32.5 G/DL (ref 31–37)
MCV RBC AUTO: 90.5 FL (ref 74–97)
MONOCYTES # BLD: 0.4 K/UL (ref 0.05–1.2)
MONOCYTES NFR BLD: 8 % (ref 3–10)
NEUTS SEG # BLD: 2.8 K/UL (ref 1.8–8)
NEUTS SEG NFR BLD: 48 % (ref 40–73)
P-R INTERVAL, ECG05: 150 MS
PLATELET # BLD AUTO: 142 K/UL (ref 135–420)
PMV BLD AUTO: 11.3 FL (ref 9.2–11.8)
POTASSIUM SERPL-SCNC: 4.4 MMOL/L (ref 3.5–5.5)
PROT SERPL-MCNC: 7.7 G/DL (ref 6.4–8.2)
Q-T INTERVAL, ECG07: 362 MS
QRS DURATION, ECG06: 72 MS
QTC CALCULATION (BEZET), ECG08: 420 MS
RBC # BLD AUTO: 5.07 M/UL (ref 4.2–5.3)
SODIUM SERPL-SCNC: 136 MMOL/L (ref 136–145)
VENTRICULAR RATE, ECG03: 81 BPM
WBC # BLD AUTO: 5.6 K/UL (ref 4.6–13.2)

## 2017-08-23 PROCEDURE — 85025 COMPLETE CBC W/AUTO DIFF WBC: CPT | Performed by: PHYSICIAN ASSISTANT

## 2017-08-23 PROCEDURE — 36415 COLL VENOUS BLD VENIPUNCTURE: CPT | Performed by: PHYSICIAN ASSISTANT

## 2017-08-23 PROCEDURE — 93005 ELECTROCARDIOGRAM TRACING: CPT

## 2017-08-23 PROCEDURE — 80053 COMPREHEN METABOLIC PANEL: CPT | Performed by: PHYSICIAN ASSISTANT

## 2017-08-23 PROCEDURE — 71020 XR CHEST PA LAT: CPT

## 2017-08-23 NOTE — TELEPHONE ENCOUNTER
Haleigh from [facility] states patient was in today for the medical clearance but it was not approved. MARIKA Gardner says she might have a possibly heart murmur and is sending her to Cardiology and wants her to do an Echo and go to the hospital to have a chest x-ray done. Not sure if she will be cleared by August 30th sx date.   Haleigh can be reached at [phone]

## 2017-10-23 ENCOUNTER — OFFICE VISIT (OUTPATIENT)
Dept: CARDIOLOGY CLINIC | Age: 62
End: 2017-10-23

## 2017-10-23 VITALS
HEIGHT: 65 IN | SYSTOLIC BLOOD PRESSURE: 136 MMHG | HEART RATE: 79 BPM | DIASTOLIC BLOOD PRESSURE: 72 MMHG | WEIGHT: 180 LBS | BODY MASS INDEX: 29.99 KG/M2

## 2017-10-23 DIAGNOSIS — E78.5 HYPERLIPIDEMIA, UNSPECIFIED HYPERLIPIDEMIA TYPE: ICD-10-CM

## 2017-10-23 DIAGNOSIS — R06.02 SOB (SHORTNESS OF BREATH) ON EXERTION: ICD-10-CM

## 2017-10-23 DIAGNOSIS — R07.9 CHEST PAIN, UNSPECIFIED TYPE: Primary | ICD-10-CM

## 2017-10-23 DIAGNOSIS — Z01.810 PRE-OPERATIVE CARDIOVASCULAR EXAMINATION: ICD-10-CM

## 2017-10-23 DIAGNOSIS — I10 ESSENTIAL HYPERTENSION: ICD-10-CM

## 2017-10-23 DIAGNOSIS — E11.9 TYPE 2 DIABETES MELLITUS WITHOUT COMPLICATION, WITHOUT LONG-TERM CURRENT USE OF INSULIN (HCC): ICD-10-CM

## 2017-10-23 NOTE — PROGRESS NOTES
1. Have you been to the ER, urgent care clinic since your last visit? Hospitalized since your last visit?     no    2. Have you seen or consulted any other health care providers outside of the [address] [place] [address] since your last visit? Include any pap smears or colon screening. Yes, pcp    3. Since your last visit, have you had any of the following symptoms? No cardiac symptoms          4. Have you had any blood work, X-rays or cardiac testing?      yes, [facility]             5.  Where do you normally have your labs drawn?   [facility]    6. Do you need any refills today?    no

## 2017-10-23 NOTE — PROGRESS NOTES
HISTORY OF PRESENT ILLNESS  Luis Kay is a 58 y.o. female. New Patient   The history is provided by the patient. This is a new problem. The problem occurs constantly. Associated symptoms include chest pain and shortness of breath. Pertinent negatives include no abdominal pain and no headaches. Chest Pain    The history is provided by the patient. This is a new problem. The current episode started more than 1 week ago. The problem has not changed since onset. The problem occurs every several days. The pain is associated with exertion. The pain is present in the substernal region. The pain is mild. The quality of the pain is described as pressure-like and tightness. The pain does not radiate. Associated symptoms include shortness of breath. Pertinent negatives include no abdominal pain, no claudication, no cough, no dizziness, no fever, no headaches, no hemoptysis, no nausea, no orthopnea, no palpitations, no PND, no sputum production, no vomiting and no weakness. Shortness of Breath   The history is provided by the patient. This is a recurrent problem. The problem occurs intermittently. The problem has not changed since onset. Associated symptoms include chest pain. Pertinent negatives include no fever, no headaches, no cough, no sputum production, no hemoptysis, no wheezing, no PND, no orthopnea, no vomiting, no abdominal pain, no rash, no leg swelling and no claudication. Precipitated by: exertion. Review of Systems   Constitutional: Negative for chills and fever. HENT: Negative for nosebleeds. Eyes: Negative for blurred vision and double vision. Respiratory: Positive for shortness of breath. Negative for cough, hemoptysis, sputum production and wheezing. Cardiovascular: Positive for chest pain. Negative for palpitations, orthopnea, claudication, leg swelling and PND. Gastrointestinal: Negative for abdominal pain, heartburn, nausea and vomiting. Musculoskeletal: Negative for myalgias. Skin: Negative for rash. Neurological: Negative for dizziness, weakness and headaches. Endo/Heme/Allergies: Does not bruise/bleed easily. Family History   Problem Relation Age of Onset    No Known Problems Mother     No Known Problems Father        Past Medical History:   Diagnosis Date    Anemia     Arthritis     Asthma     Chronic pain     chronic back pain    Chronic sinusitis     Diabetes ([address]     Fibromyalgia     GERD (gastroesophageal reflux disease)     Hepatitis B 10 years ago    Hypertension     Unspecified sleep apnea     uses cpap sometimes       Past Surgical History:   Procedure Laterality Date    HX BREAST LUMPECTOMY  2011    right breast    HX  SECTION      HX CHOLECYSTECTOMY      HX GYN      tubal ligation    HX ORTHOPAEDIC      right knee replacement    HX ORTHOPAEDIC Left     carpal tunnel       Social History   Substance Use Topics    Smoking status: Never Smoker    Smokeless tobacco: Never Used    Alcohol use No       Allergies   Allergen Reactions    Latex, Natural Rubber Rash     Latex tape causes rash to area    Celebrex [Celecoxib] Hives    Iodine Hives    Sulfa (Sulfonamide Antibiotics) Hives       Prior to Admission medications    Medication Sig Start Date End Date Taking? Authorizing Provider   KLOR-CON M10 10 mEq tablet  17   Historical Provider   traMADol (ULTRAM-ER) 200 mg tablet Take 1 Tab by mouth daily. Max Daily Amount: 200 mg. 17   Nikhil Fan NP   cyclobenzaprine (FLEXERIL) 10 mg tablet Take 1 Tab by mouth three (3) times daily as needed.  17   Nikhil Fan NP   amoxicillin-clavulanate (AUGMENTIN) 875-125 mg per tablet  3/21/17   Historical Provider   chlorpheniramine-HYDROcodone (TUSSIONEX) 10-8 mg/5 mL suspension  3/21/17   Historical Provider   oxyCODONE-acetaminophen (PERCOCET) 5-325 mg per tablet  17   Historical Provider   promethazine (PHENERGAN) 25 mg tablet  17   Historical Provider losartan-hydroCHLOROthiazide (HYZAAR) 100-12.5 mg per tablet  2/1/17   Historical Provider   potassium chloride SR (KLOR-CON 10) 10 mEq tablet  2/1/17   Historical Provider   Azelastine (ASTEPRO) 0.15 % (205.5 mcg) nasal spray  10/27/16   Historical Provider   DEXILANT 30 mg capsule  12/1/16   Historical Provider   HYDROcodone-acetaminophen (NORCO) 5-325 mg per tablet  1/25/17   Historical Provider   sodium chloride irrigation 0.9 % irrigation  11/11/16   Historical Provider   traMADol (ULTRAM) 50 mg tablet  12/24/16   Historical Provider   pregabalin (LYRICA) 100 mg capsule Take 1 Cap by mouth three (3) times daily. Max Daily Amount: 300 mg. Indications: FIBROMYALGIA 2/1/17   [facility] Corey Dillon MD   topiramate (TOPAMAX) 25 mg tablet Take 1 Tab by mouth two (2) times a day. 11/29/16   Diannesandeep Yee, NP   FARXIGA 5 mg tab tablet Take 5 mg by mouth daily. 8/31/15   Historical Provider   cetirizine (ZYRTEC) 10 mg tablet Take 10 mg by mouth daily. 9/19/15   Historical Provider   metFORMIN (GLUCOPHAGE) 1,000 mg tablet Take 1,000 mg by mouth two (2) times daily (with meals). 9/24/15   Historical Provider   montelukast (SINGULAIR) 10 mg tablet Take 10 mg by mouth daily. 8/3/15   Historical Provider   promethazine (PHENERGAN) 12.5 mg tablet Take 12.5 mg by mouth as needed. 8/3/15   Historical Provider   tiotropium (SPIRIVA WITH HANDIHALER) 18 mcg inhalation capsule Take 1 Cap by inhalation daily. Historical Provider   cloNIDine (CATAPRES) 0.1 mg tablet Take 0.1 mg by mouth nightly. Historical Provider   Dexlansoprazole (DEXILANT) 60 mg CpDM Take  by mouth daily. Historical Provider   losartan-hydrochlorothiazide (HYZAAR) 100-25 mg per tablet Take 1 Tab by mouth daily. Historical Provider   potassium chloride SA (MICRO-K) 10 mEq capsule Take 10 mEq by mouth daily. Historical Provider   progesterone (PROMETRIUM) 100 mg capsule Take 100 mg by mouth daily.       Historical Provider   albuterol (PROVENTIL HFA, VENTOLIN HFA) 90 mcg/actuation inhaler Take 1 Puff by inhalation every four (4) hours as needed. Historical Provider   ergocalciferol (VITAMIN D2) 50,000 unit capsule Take 50,000 Units by mouth every seven (7) days. Historical Provider   ezetimibe-simvastatin (VYTORIN 10/40) 10-40 mg per tablet Take 1 Tab by mouth nightly. Historical Provider         Visit Vitals    /72    Pulse 79    Ht 5' 5\" (1.651 m)    Wt 81.6 kg (180 lb)    BMI 29.95 kg/m2       Physical Exam   Constitutional: She is oriented to person, place, and time. She appears well-developed and well-nourished. HENT:   Head: Normocephalic and atraumatic. Eyes: Conjunctivae are normal.   Neck: Neck supple. No JVD present. No tracheal deviation present. No thyromegaly present. Cardiovascular: Normal rate and regular rhythm. PMI is not displaced. Exam reveals no gallop, no S3 and no decreased pulses. No murmur heard. Pulmonary/Chest: No respiratory distress. She has no wheezes. She has no rales. She exhibits no tenderness. Abdominal: Soft. There is no tenderness. Musculoskeletal: She exhibits no edema. Neurological: She is alert and oriented to person, place, and time. Skin: Skin is warm. Psychiatric: She has a normal mood and affect. Ms. Amari Zavala has a reminder for a \"due or due soon\" health maintenance. I have asked that she contact her primary care provider for follow-up on this health maintenance. I have personally reviewed patients ekg done at other facility. I Have personally reviewed recent relevant labs available and discussed with patient  cxr,labs    Assessment         ICD-10-CM ICD-9-CM    1. Chest pain, unspecified type R07.9 786.50 2D ECHO COMPLETE ADULT (TTE)      SCHEDULE NUCLEAR STUDY    possible angina,gerd   2. Essential hypertension I10 401.9     stable   3. Type 2 diabetes mellitus without complication, without long-term current use of insulin (HCC) E11.9 250.00    4.  Hyperlipidemia, unspecified hyperlipidemia type E78.5 272.4     stable   5. Pre-operative cardiovascular examination Z01.810 V72.81 2D ECHO COMPLETE ADULT (TTE)      SCHEDULE NUCLEAR STUDY    for cervical spine surgery  decide on risk/clearence based on stress stest   6. SOB (shortness of breath) on exertion R06.02 786.05 2D ECHO COMPLETE ADULT (TTE)      SCHEDULE NUCLEAR STUDY    on and off         There are no discontinued medications. Orders Placed This Encounter    SCHEDULE NUCLEAR STUDY     Exercise stress test     Standing Status:   Future     Standing Expiration Date:   10/23/2018    2D ECHO COMPLETE ADULT (TTE)     Standing Status:   Future     Standing Expiration Date:   4/21/2018     Order Specific Question:   Reason for Exam:     Answer:   see diagnosis       Follow-up Disposition:  Return for F/u after tests.

## 2017-10-23 NOTE — MR AVS SNAPSHOT
Visit Information Date & Time Provider Department Dept. Phone Encounter #  
 10/23/2017 12:30 PM Gladis Colby MD [facility] [place] [phone] [id number] Follow-up Instructions Return for F/u after tests. Upcoming Health Maintenance Date Due Hepatitis C Screening 1955 DTaP/Tdap/Td series (1 - Tdap) 3/23/1976 PAP AKA CERVICAL CYTOLOGY 3/23/1976 BREAST CANCER SCRN MAMMOGRAM 3/23/2005 FOBT Q 1 YEAR AGE 50-75 3/23/2005 ZOSTER VACCINE AGE 60> 1/23/2015 INFLUENZA AGE 9 TO ADULT 8/1/2017 Allergies as of 10/23/2017  Review Complete On: 10/23/2017 By: Gladis Colby MD  
  
 Severity Noted Reaction Type Reactions Latex, Natural Rubber  10/01/2013   Side Effect Rash Latex tape causes rash to area Celebrex [Celecoxib]  09/06/2012    Hives Iodine  09/06/2012    Hives Sulfa (Sulfonamide Antibiotics)  09/06/2012    Hives Current Immunizations  Never Reviewed No immunizations on file. Not reviewed this visit You Were Diagnosed With   
  
 Codes Comments Chest pain, unspecified type    -  Primary ICD-10-CM: R07.9 ICD-9-CM: 786.50 possible angina,gerd Essential hypertension     ICD-10-CM: I10 
ICD-9-CM: 401.9 stable Type 2 diabetes mellitus without complication, without long-term current use of insulin (HCC)     ICD-10-CM: E11.9 ICD-9-CM: 250.00 Hyperlipidemia, unspecified hyperlipidemia type     ICD-10-CM: E78.5 ICD-9-CM: 272.4 stable Pre-operative cardiovascular examination     ICD-10-CM: Z01.810 ICD-9-CM: V72.81 for cervical spine surgery 
decide on risk/clearence based on stress stest  
 SOB (shortness of breath) on exertion     ICD-10-CM: R06.02 
ICD-9-CM: 786.05 on and off Vitals BP Pulse Height(growth percentile) Weight(growth percentile) BMI OB Status 136/72 79 5' 5\" (1.651 m) 180 lb (81.6 kg) 29.95 kg/m2 Postmenopausal  
 Smoking Status Never Smoker Vitals History BMI and BSA Data Body Mass Index Body Surface Area  
 29.95 kg/m 2 1.93 m 2 Preferred Pharmacy Pharmacy Name Phone [id number] [address] [phone] Your Updated Medication List  
  
   
This list is accurate as of: 10/23/17 12:46 PM.  Always use your most recent med list.  
  
  
  
  
 albuterol 90 mcg/actuation inhaler Commonly known as:  PROVENTIL HFA, VENTOLIN HFA, PROAIR HFA Take 1 Puff by inhalation every four (4) hours as needed. amoxicillin-clavulanate 875-125 mg per tablet Commonly known as:  AUGMENTIN Azelastine 0.15 % (205.5 mcg) nasal spray Commonly known as:  ASTEPRO  
  
 cetirizine 10 mg tablet Commonly known as:  ZYRTEC Take 10 mg by mouth daily. chlorpheniramine-HYDROcodone 10-8 mg/5 mL suspension Commonly known as:  TUSSIONEX  
  
 cloNIDine HCl 0.1 mg tablet Commonly known as:  CATAPRES Take 0.1 mg by mouth nightly. cyclobenzaprine 10 mg tablet Commonly known as:  FLEXERIL Take 1 Tab by mouth three (3) times daily as needed. * DEXILANT 60 mg Cpdb Generic drug:  Dexlansoprazole Take  by mouth daily. * DEXILANT 30 mg capsule Generic drug:  dexlansoprazole FARXIGA 5 mg Tab tablet Generic drug:  dapagliflozin Take 5 mg by mouth daily. HYDROcodone-acetaminophen 5-325 mg per tablet Commonly known as:  NORCO  
  
 * HYZAAR 100-25 mg per tablet Generic drug:  losartan-hydroCHLOROthiazide Take 1 Tab by mouth daily. * losartan-hydroCHLOROthiazide 100-12.5 mg per tablet Commonly known as:  HYZAAR  
  
 metFORMIN 1,000 mg tablet Commonly known as:  GLUCOPHAGE Take 1,000 mg by mouth two (2) times daily (with meals). montelukast 10 mg tablet Commonly known as:  SINGULAIR Take 10 mg by mouth daily. oxyCODONE-acetaminophen 5-325 mg per tablet Commonly known as:  PERCOCET  
  
 * potassium chloride SA 10 mEq capsule Commonly known as:  Bret Pinzon Take 10 mEq by mouth daily. * potassium chloride SR 10 mEq tablet Commonly known as:  KLOR-CON 10  
  
 * KLOR-CON M10 10 mEq tablet Generic drug:  potassium chloride  
  
 pregabalin 100 mg capsule Commonly known as:  Autumn Bai Take 1 Cap by mouth three (3) times daily. Max Daily Amount: 300 mg. Indications: FIBROMYALGIA * promethazine 12.5 mg tablet Commonly known as:  PHENERGAN Take 12.5 mg by mouth as needed. * promethazine 25 mg tablet Commonly known as:  PHENERGAN  
  
 PROMETRIUM 100 mg capsule Generic drug:  progesterone Take 100 mg by mouth daily. sodium chloride irrigation 0.9 % irrigation SPIRIVA WITH HANDIHALER 18 mcg inhalation capsule Generic drug:  tiotropium Take 1 Cap by inhalation daily. topiramate 25 mg tablet Commonly known as:  TOPAMAX Take 1 Tab by mouth two (2) times a day. * traMADol 50 mg tablet Commonly known as:  ULTRAM  
  
 * traMADol 200 mg tablet Commonly known as:  ULTRAM-ER Take 1 Tab by mouth daily. Max Daily Amount: 200 mg. VITAMIN D2 50,000 unit capsule Generic drug:  ergocalciferol Take 50,000 Units by mouth every seven (7) days. Vytorin 10/40 10-40 mg per tablet Generic drug:  ezetimibe-simvastatin Take 1 Tab by mouth nightly. * Notice: This list has 11 medication(s) that are the same as other medications prescribed for you. Read the directions carefully, and ask your doctor or other care provider to review them with you. Follow-up Instructions Return for F/u after tests. To-Do List   
 10/26/2017 Cardiac Services:  2D ECHO COMPLETE ADULT (TTE) 10/30/2017 Nursing:  SCHEDULE NUCLEAR STUDY Introducing [facility]!    
 [place] [facility] introduces [employer] patient portal. Now you can access parts of your medical record, email your doctor's office, and request medication refills online. 1. In your internet browser, go to [email or web address] 2. Click on the First Time User? Click Here link in the Sign In box. You will see the New Member Sign Up page. 3. Enter your "[employer] Access Code exactly as it appears below. You will not need to use this code after youve completed the sign-up process. If you do not sign up before the expiration date, you must request a new code. · "[employer] Access Code: [email or web address] Expires: 1/21/2018 12:46 PM 
 
4. Enter the last four digits of your Social Security Number (xxxx) and Date of Birth (mm/dd/yyyy) as indicated and click Submit. You will be taken to the next sign-up page. 5. Create a "[employer] ID. This will be your "[employer] login ID and cannot be changed, so think of one that is secure and easy to remember. 6. Create a "[employer] password. You can change your password at any time. 7. Enter your Password Reset Question and Answer. This can be used at a later time if you forget your password. 8. Enter your e-mail address. You will receive e-mail notification when new information is available in [address] 9. Click Sign Up. You can now view and download portions of your medical record. 10. Click the Download Summary menu link to download a portable copy of your medical information. If you have questions, please visit the Frequently Asked Questions section of the "[employer] website. Remember, "[employer] is NOT to be used for urgent needs. For medical emergencies, dial 911. Now available from your iPhone and Android! Please provide this summary of care documentation to your next provider. Your primary care clinician is listed as Yandy Luna. If you have any questions after today's visit, please call [phone].

## 2017-10-23 NOTE — PROGRESS NOTES
1. Have you been to the ER, urgent care clinic since your last visit? Hospitalized since your last visit? [facility]     2. Have you seen or consulted any other health care providers outside of the [address] [place] [address] since your last visit? Include any pap smears or colon screening.  {Yes when where and reason for visit:52950}

## 2017-10-23 NOTE — LETTER
Coni Blanco 1955 
 
10/23/2017 Dear Essence Carter, MARIKA Garcia I had the pleasure of evaluating  Ms. Ziyad Manriquez in office today. Below are the relevant portions of my assessment and plan of care. ICD-10-CM ICD-9-CM 1. Chest pain, unspecified type R07.9 786.50 2D ECHO COMPLETE ADULT (TTE) SCHEDULE NUCLEAR STUDY possible angina,gerd 2. Essential hypertension I10 401.9   
 stable 3. Type 2 diabetes mellitus without complication, without long-term current use of insulin (HCC) E11.9 250.00   
4. Hyperlipidemia, unspecified hyperlipidemia type E78.5 272.4   
 stable 5. Pre-operative cardiovascular examination Z01.810 V72.81 2D ECHO COMPLETE ADULT (TTE) SCHEDULE NUCLEAR STUDY  
 for cervical spine surgery 
decide on risk/clearence based on stress stest  
6. SOB (shortness of breath) on exertion R06.02 786.05 2D ECHO COMPLETE ADULT (TTE) SCHEDULE NUCLEAR STUDY  
 on and off Current Outpatient Prescriptions Medication Sig Dispense Refill  KLOR-CON M10 10 mEq tablet   2  
 traMADol (ULTRAM-ER) 200 mg tablet Take 1 Tab by mouth daily. Max Daily Amount: 200 mg. 30 Tab 1  cyclobenzaprine (FLEXERIL) 10 mg tablet Take 1 Tab by mouth three (3) times daily as needed. 90 Tab 2  
 amoxicillin-clavulanate (AUGMENTIN) 875-125 mg per tablet   0  
 chlorpheniramine-HYDROcodone (TUSSIONEX) 10-8 mg/5 mL suspension   0  
 oxyCODONE-acetaminophen (PERCOCET) 5-325 mg per tablet   0  
 promethazine (PHENERGAN) 25 mg tablet   0  
 losartan-hydroCHLOROthiazide (HYZAAR) 100-12.5 mg per tablet   5  potassium chloride SR (KLOR-CON 10) 10 mEq tablet   12  
 Azelastine (ASTEPRO) 0.15 % (205.5 mcg) nasal spray   12  DEXILANT 30 mg capsule   12  
 HYDROcodone-acetaminophen (NORCO) 5-325 mg per tablet   0  
 sodium chloride irrigation 0.9 % irrigation   0  
 traMADol (ULTRAM) 50 mg tablet   0  
 pregabalin (LYRICA) 100 mg capsule Take 1 Cap by mouth three (3) times daily. Max Daily Amount: 300 mg. Indications: FIBROMYALGIA 90 Cap 2  
 topiramate (TOPAMAX) 25 mg tablet Take 1 Tab by mouth two (2) times a day. 60 Tab 1  
 FARXIGA 5 mg tab tablet Take 5 mg by mouth daily. 5  
 cetirizine (ZYRTEC) 10 mg tablet Take 10 mg by mouth daily. 5  
 metFORMIN (GLUCOPHAGE) 1,000 mg tablet Take 1,000 mg by mouth two (2) times daily (with meals). 12  
 montelukast (SINGULAIR) 10 mg tablet Take 10 mg by mouth daily. 4  
 promethazine (PHENERGAN) 12.5 mg tablet Take 12.5 mg by mouth as needed. 0  
 tiotropium (SPIRIVA WITH HANDIHALER) 18 mcg inhalation capsule Take 1 Cap by inhalation daily.  cloNIDine (CATAPRES) 0.1 mg tablet Take 0.1 mg by mouth nightly.  Dexlansoprazole (DEXILANT) 60 mg CpDM Take  by mouth daily.  losartan-hydrochlorothiazide (HYZAAR) 100-25 mg per tablet Take 1 Tab by mouth daily.  potassium chloride SA (MICRO-K) 10 mEq capsule Take 10 mEq by mouth daily.  progesterone (PROMETRIUM) 100 mg capsule Take 100 mg by mouth daily.  albuterol (PROVENTIL HFA, VENTOLIN HFA) 90 mcg/actuation inhaler Take 1 Puff by inhalation every four (4) hours as needed.  ergocalciferol (VITAMIN D2) 50,000 unit capsule Take 50,000 Units by mouth every seven (7) days.  ezetimibe-simvastatin (VYTORIN 10/40) 10-40 mg per tablet Take 1 Tab by mouth nightly. Orders Placed This Encounter  SCHEDULE NUCLEAR STUDY Exercise stress test  
  Standing Status:   Future Standing Expiration Date:   10/23/2018  2D ECHO COMPLETE ADULT (TTE) Standing Status:   Future Standing Expiration Date:   4/21/2018 Order Specific Question:   Reason for Exam: Answer:   see diagnosis If you have questions, please do not hesitate to call me. I look forward to following Ms. Shmuel Almanzar along with you. Sincerely, Keaton Judd MD

## 2017-10-26 ENCOUNTER — CLINICAL SUPPORT (OUTPATIENT)
Dept: CARDIOLOGY CLINIC | Age: 62
End: 2017-10-26

## 2017-10-26 ENCOUNTER — OFFICE VISIT (OUTPATIENT)
Dept: CARDIOLOGY CLINIC | Age: 62
End: 2017-10-26

## 2017-10-26 VITALS
SYSTOLIC BLOOD PRESSURE: 148 MMHG | DIASTOLIC BLOOD PRESSURE: 79 MMHG | HEIGHT: 65 IN | WEIGHT: 180 LBS | BODY MASS INDEX: 29.99 KG/M2 | HEART RATE: 77 BPM

## 2017-10-26 DIAGNOSIS — R07.9 CHEST PAIN, UNSPECIFIED TYPE: ICD-10-CM

## 2017-10-26 DIAGNOSIS — Z01.810 PRE-OPERATIVE CARDIOVASCULAR EXAMINATION: ICD-10-CM

## 2017-10-26 DIAGNOSIS — R06.02 SOB (SHORTNESS OF BREATH) ON EXERTION: ICD-10-CM

## 2017-10-26 DIAGNOSIS — R07.9 CHEST PAIN, UNSPECIFIED TYPE: Primary | ICD-10-CM

## 2017-10-26 DIAGNOSIS — E78.5 HYPERLIPIDEMIA, UNSPECIFIED HYPERLIPIDEMIA TYPE: ICD-10-CM

## 2017-10-26 DIAGNOSIS — I10 ESSENTIAL HYPERTENSION: Primary | ICD-10-CM

## 2017-10-26 DIAGNOSIS — I10 ESSENTIAL HYPERTENSION, MALIGNANT: ICD-10-CM

## 2017-10-26 NOTE — LETTER
Sun Delgado 1955 
 
10/26/2017 Dear MARIKA Steinberg I had the pleasure of evaluating  Ms. Nain Trejo in office today. Below are the relevant portions of my assessment and plan of care. ICD-10-CM ICD-9-CM 1. Essential hypertension I10 401.9   
 mildly elevated post stress test  
2. Hyperlipidemia, unspecified hyperlipidemia type E78.5 272.4 3. Chest pain, unspecified type R07.9 786.50   
 stable 
negative stress test  
4. Pre-operative cardiovascular examination Z01.810 V72.81   
 stable cardiac status 
ok to proceed with neck surgery-low cardiac risk Current Outpatient Prescriptions Medication Sig Dispense Refill  KLOR-CON M10 10 mEq tablet   2  
 traMADol (ULTRAM-ER) 200 mg tablet Take 1 Tab by mouth daily. Max Daily Amount: 200 mg. 30 Tab 1  cyclobenzaprine (FLEXERIL) 10 mg tablet Take 1 Tab by mouth three (3) times daily as needed. 90 Tab 2  
 amoxicillin-clavulanate (AUGMENTIN) 875-125 mg per tablet   0  
 chlorpheniramine-HYDROcodone (TUSSIONEX) 10-8 mg/5 mL suspension   0  
 oxyCODONE-acetaminophen (PERCOCET) 5-325 mg per tablet   0  
 promethazine (PHENERGAN) 25 mg tablet   0  
 losartan-hydroCHLOROthiazide (HYZAAR) 100-12.5 mg per tablet   5  potassium chloride SR (KLOR-CON 10) 10 mEq tablet   12  
 Azelastine (ASTEPRO) 0.15 % (205.5 mcg) nasal spray   12  DEXILANT 30 mg capsule   12  
 HYDROcodone-acetaminophen (NORCO) 5-325 mg per tablet   0  
 sodium chloride irrigation 0.9 % irrigation   0  
 traMADol (ULTRAM) 50 mg tablet   0  
 pregabalin (LYRICA) 100 mg capsule Take 1 Cap by mouth three (3) times daily. Max Daily Amount: 300 mg. Indications: FIBROMYALGIA 90 Cap 2  
 topiramate (TOPAMAX) 25 mg tablet Take 1 Tab by mouth two (2) times a day. 60 Tab 1  
 FARXIGA 5 mg tab tablet Take 5 mg by mouth daily. 5  
 cetirizine (ZYRTEC) 10 mg tablet Take 10 mg by mouth daily.   5  
 metFORMIN (GLUCOPHAGE) 1,000 mg tablet Take 1,000 mg by mouth two (2) times daily (with meals). 12  
 montelukast (SINGULAIR) 10 mg tablet Take 10 mg by mouth daily. 4  
 promethazine (PHENERGAN) 12.5 mg tablet Take 12.5 mg by mouth as needed. 0  
 tiotropium (SPIRIVA WITH HANDIHALER) 18 mcg inhalation capsule Take 1 Cap by inhalation daily.  cloNIDine (CATAPRES) 0.1 mg tablet Take 0.1 mg by mouth nightly.  Dexlansoprazole (DEXILANT) 60 mg CpDM Take  by mouth daily.  losartan-hydrochlorothiazide (HYZAAR) 100-25 mg per tablet Take 1 Tab by mouth daily.  progesterone (PROMETRIUM) 100 mg capsule Take 100 mg by mouth daily.  albuterol (PROVENTIL HFA, VENTOLIN HFA) 90 mcg/actuation inhaler Take 1 Puff by inhalation every four (4) hours as needed.  ergocalciferol (VITAMIN D2) 50,000 unit capsule Take 50,000 Units by mouth every seven (7) days.  ezetimibe-simvastatin (VYTORIN 10/40) 10-40 mg per tablet Take 1 Tab by mouth nightly.  regadenoson (LEXISCAN) 0.4 mg/5 mL syrg injection 5 mL by IntraVENous route once for 1 dose. 5 mL 0  
 potassium chloride SA (MICRO-K) 10 mEq capsule Take 10 mEq by mouth daily. No orders of the defined types were placed in this encounter. If you have questions, please do not hesitate to call me. I look forward to following Ms. Mace Shown along with you. Sincerely, Alexa Ma MD

## 2017-10-26 NOTE — PROGRESS NOTES
[facility]  [address], [address] [place] [address], [place], [address] [place] [address]  [phone] [place] [phone]      Name: Dick Ayon         MRN#: [id number]      YOB: 1955     Gender: female Ht:5'5 \" Wt:180 lbs            Date of Rest/Stress Images: 10/26/2017   Referring Provider: MARIKA Parikh  Ordering Provider: Migel Thurston. Ramos Pichardo MD, [facility]  Technologist: Karl BAIRES, C.N.M.T. Diagnosis:   1. Chest pain, unspecified type    2. Essential hypertension, malignant    3. SOB (shortness of breath) on exertion    4. Pre-operative cardiovascular examination          Rest/Stress Myoview SPECT Myocardial Perfusion Imaging with  Exercise / Lexiscan Stress and Gated SPECT Imaging      PROCEDURE:      Myocardial perfusion imaging was performed at rest approximately 30 mins following the intravenous injection,(Right hand ) of 11.5 mCi of Tc99m Myoview for evaluation of myocardial function and perfusion at rest.     Baseline Data:    Baseline EKG reveals sinus rhythm, within normal limits. Baseline heart rate is 58. Baseline blood pressure is 136/84. Procedure: The patient was initially exercised using the standard Klever protocol for 4 minutes, 59 seconds. Exercise was stopped because of fatigue. The patient had no chest pain. Heart rate increased from baseline to a heart rate of 123, achieving 77% of the maximum predicted heart rate. Blood pressure response with exercise was appropriate. Peak blood pressure was 158/90. In view of inadequate heart rate response, 0.4 mg of IV Lexiscan was injected. The patient did not have any significant symptoms after Lexiscan injection. Electrocardiogram showed no significant ST-T changes during the procedure. Diagnosis:   1. Inconclusive EKG portion of exercise stress test with the patient achieving 77% of the maximum predicted heart rate.     2. Reduced functional tolerance. 3. Jose Cruz Rubi was used to complete the protocol. 4. Nuclear imaging report to follow. Pharmacological:  Patient was injected with . 4 mg/mL with Lexiscan intravenously over a period 10 to 20 sec. After pharmacologic stress, the patient was injected intravenously with 35.6 mCi of Tc99m Myoview. Gating post stress tomographic imaging performed approximately 45 minutes post tracer injection. The data was reconstructed in the short, horizontal long and vertical long axis views and tomographic slices were generated. NUCLEAR IMAGING:    Findings:   1. Stress images reveal normal Myoview distrubution in all the LV segments in short axis, vertical and horizontal long axis views. 2. Resting images have a normal uptake. 3. Gated images reveal normal wall motion and the ejection fraction is calculated to be 76%. Conclusion:   1. Normal perfusion scan. 2. Normal wall motion and ejection fraction. 3. No evidence of significant fixed or reversible defect suggesting ischemia or myocardial infarction noted from this nuclear study. 4. Low risk scan. Thank you for the referral.    E-signed and Interpreting Physician:    Migel Thurston.  Ramos Pichardo MD, [facility]     Date of interpretation: 10/26/2017  Date of final report: 10/26/2017

## 2017-10-26 NOTE — LETTER
10/26/2017 12:24 PM 
 
Ms. Tamia Parr [address] [facility] [address] [address] [id number] Dear Dr. Anni Keys: 
 
Re: Tamia Parr : 1955 Ms. Starr Iyer is cleared from a cardiac standpoint with low risk for neck surgery. If you have any questions or any further assistance is needed please contact our office. Sincerely, Signed By: Juanjose Evans MD  
 2017 cc:   Sheran Jeans, PA

## 2017-10-26 NOTE — MR AVS SNAPSHOT
Visit Information Date & Time Provider Department Dept. Phone Encounter #  
 10/26/2017 11:45 AM Massiel Marquez MD [facility] [place] [id number] Follow-up Instructions Return in about 1 year (around 10/26/2018) for Cleared for planned surgery, low risk. Your Appointments 10/9/2018  9:30 AM  
Follow Up with Massiel Marquez MD  
[facility] [place] ([facility]) Appt Note: 1 year follow up  
 [address] [facility] [address]  
  
   
 [address] [place] [id number] Upcoming Health Maintenance Date Due Hepatitis C Screening 1955 FOOT EXAM Q1 3/23/1965 EYE EXAM RETINAL OR DILATED Q1 3/23/1965 Pneumococcal 19-64 Medium Risk (1 of 1 - PPSV23) 3/23/1974 DTaP/Tdap/Td series (1 - Tdap) 3/23/1976 PAP AKA CERVICAL CYTOLOGY 3/23/1976 BREAST CANCER SCRN MAMMOGRAM 3/23/2005 FOBT Q 1 YEAR AGE 50-75 3/23/2005 HEMOGLOBIN A1C Q6M 12/23/2010 MICROALBUMIN Q1 6/23/2011 LIPID PANEL Q1 6/23/2011 ZOSTER VACCINE AGE 60> 1/23/2015 INFLUENZA AGE 9 TO ADULT 8/1/2017 Allergies as of 10/26/2017  Review Complete On: 10/26/2017 By: Massiel Marquez MD  
  
 Severity Noted Reaction Type Reactions Latex, Natural Rubber  10/01/2013   Side Effect Rash Latex tape causes rash to area Celebrex [Celecoxib]  09/06/2012    Hives Iodine  09/06/2012    Hives Sulfa (Sulfonamide Antibiotics)  09/06/2012    Hives Current Immunizations  Never Reviewed No immunizations on file. Not reviewed this visit You Were Diagnosed With   
  
 Codes Comments Essential hypertension    -  Primary ICD-10-CM: I10 
ICD-9-CM: 401.9 mildly elevated post stress test  
 Hyperlipidemia, unspecified hyperlipidemia type     ICD-10-CM: E78.5 ICD-9-CM: 272.4 Chest pain, unspecified type     ICD-10-CM: R07.9 ICD-9-CM: 786.50 stable 
negative stress test  
 Pre-operative cardiovascular examination     ICD-10-CM: Z01.810 ICD-9-CM: V72.81 stable cardiac status 
ok to proceed with neck surgery-low cardiac risk Vitals BP Pulse Height(growth percentile) Weight(growth percentile) BMI OB Status 148/79 77 5' 5\" (1.651 m) 180 lb (81.6 kg) 29.95 kg/m2 Postmenopausal  
 Smoking Status Never Smoker BMI and BSA Data Body Mass Index Body Surface Area  
 29.95 kg/m 2 1.93 m 2 Preferred Pharmacy Pharmacy Name Phone [id number] - [address] [phone] Your Updated Medication List  
  
   
This list is accurate as of: 10/26/17 12:27 PM.  Always use your most recent med list.  
  
  
  
  
 albuterol 90 mcg/actuation inhaler Commonly known as:  PROVENTIL HFA, VENTOLIN HFA, PROAIR HFA Take 1 Puff by inhalation every four (4) hours as needed. amoxicillin-clavulanate 875-125 mg per tablet Commonly known as:  AUGMENTIN Azelastine 0.15 % (205.5 mcg) nasal spray Commonly known as:  ASTEPRO  
  
 cetirizine 10 mg tablet Commonly known as:  ZYRTEC Take 10 mg by mouth daily. chlorpheniramine-HYDROcodone 10-8 mg/5 mL suspension Commonly known as:  TUSSIONEX  
  
 cloNIDine HCl 0.1 mg tablet Commonly known as:  CATAPRES Take 0.1 mg by mouth nightly. cyclobenzaprine 10 mg tablet Commonly known as:  FLEXERIL Take 1 Tab by mouth three (3) times daily as needed. * DEXILANT 60 mg Cpdb Generic drug:  Dexlansoprazole Take  by mouth daily. * DEXILANT 30 mg capsule Generic drug:  dexlansoprazole FARXIGA 5 mg Tab tablet Generic drug:  dapagliflozin Take 5 mg by mouth daily. HYDROcodone-acetaminophen 5-325 mg per tablet Commonly known as:  NORCO  
  
 * HYZAAR 100-25 mg per tablet Generic drug:  losartan-hydroCHLOROthiazide Take 1 Tab by mouth daily. * losartan-hydroCHLOROthiazide 100-12.5 mg per tablet Commonly known as:  HYZAAR  
  
 metFORMIN 1,000 mg tablet Commonly known as:  GLUCOPHAGE Take 1,000 mg by mouth two (2) times daily (with meals). montelukast 10 mg tablet Commonly known as:  SINGULAIR Take 10 mg by mouth daily. oxyCODONE-acetaminophen 5-325 mg per tablet Commonly known as:  PERCOCET * potassium chloride SA 10 mEq capsule Commonly known as:  Morteza Muddy Take 10 mEq by mouth daily. * potassium chloride SR 10 mEq tablet Commonly known as:  KLOR-CON 10  
  
 * KLOR-CON M10 10 mEq tablet Generic drug:  potassium chloride  
  
 pregabalin 100 mg capsule Commonly known as:  Lear Yoel Take 1 Cap by mouth three (3) times daily. Max Daily Amount: 300 mg. Indications: FIBROMYALGIA * promethazine 12.5 mg tablet Commonly known as:  PHENERGAN Take 12.5 mg by mouth as needed. * promethazine 25 mg tablet Commonly known as:  PHENERGAN  
  
 PROMETRIUM 100 mg capsule Generic drug:  progesterone Take 100 mg by mouth daily. regadenoson 0.4 mg/5 mL Syrg injection Commonly known as:  LEXISCAN  
5 mL by IntraVENous route once for 1 dose. sodium chloride irrigation 0.9 % irrigation SPIRIVA WITH HANDIHALER 18 mcg inhalation capsule Generic drug:  tiotropium Take 1 Cap by inhalation daily. topiramate 25 mg tablet Commonly known as:  TOPAMAX Take 1 Tab by mouth two (2) times a day. * traMADol 50 mg tablet Commonly known as:  ULTRAM  
  
 * traMADol 200 mg tablet Commonly known as:  ULTRAM-ER Take 1 Tab by mouth daily. Max Daily Amount: 200 mg. VITAMIN D2 50,000 unit capsule Generic drug:  ergocalciferol Take 50,000 Units by mouth every seven (7) days. Vytorin 10/40 10-40 mg per tablet Generic drug:  ezetimibe-simvastatin Take 1 Tab by mouth nightly. * Notice: This list has 11 medication(s) that are the same as other medications prescribed for you.  Read the directions carefully, and ask your doctor or other care provider to review them with you. Follow-up Instructions Return in about 1 year (around 10/26/2018) for Cleared for planned surgery, low risk. Introducing \[facility] [place] [facility]\"" & HEALTH SERVICES! [facility] introduces [employer] patient portal. Now you can access parts of your medical record, email your doctor's office, and request medication refills online. 1. In your internet browser, go to [email or web address] 2. Click on the First Time User? Click Here link in the Sign In box. You will see the New Member Sign Up page. 3. Enter your [employer] Access Code exactly as it appears below. You will not need to use this code after youve completed the sign-up process. If you do not sign up before the expiration date, you must request a new code. · [employer] Access Code: [email or web address] Expires: 1/21/2018 12:46 PM 
 
4. Enter the last four digits of your Social Security Number (xxxx) and Date of Birth (mm/dd/yyyy) as indicated and click Submit. You will be taken to the next sign-up page. 5. Create a [employer] ID. This will be your [employer] login ID and cannot be changed, so think of one that is secure and easy to remember. 6. Create a [employer] password. You can change your password at any time. 7. Enter your Password Reset Question and Answer. This can be used at a later time if you forget your password. 8. Enter your e-mail address. You will receive e-mail notification when new information is available in [address] 9. Click Sign Up. You can now view and download portions of your medical record. 10. Click the Download Summary menu link to download a portable copy of your medical information. If you have questions, please visit the Frequently Asked Questions section of the [employer] website. Remember, [employer] is NOT to be used for urgent needs. For medical emergencies, dial 911. Now available from your iPhone and Android! Please provide this summary of care documentation to your next provider. Your primary care clinician is listed as Ela Toscano. If you have any questions after today's visit, please call [phone].

## 2017-10-26 NOTE — PROGRESS NOTES
HISTORY OF PRESENT ILLNESS  Arturo Cerrato is a 58 y.o. female. HPI Comments: Patient is here for follow up of diagnostic tests. Results will be discussed. Hypertension   The history is provided by the patient. This is a chronic problem. The problem occurs constantly. Associated symptoms include chest pain and shortness of breath. Pertinent negatives include no abdominal pain and no headaches. Chest Pain (Angina)    The history is provided by the patient. This is a new problem. The current episode started more than 1 week ago. The problem has not changed since onset. The problem occurs every several days. The pain is associated with exertion. The pain is present in the substernal region. The pain is mild. The quality of the pain is described as pressure-like and tightness. The pain does not radiate. Associated symptoms include shortness of breath. Pertinent negatives include no abdominal pain, no claudication, no cough, no dizziness, no fever, no headaches, no hemoptysis, no nausea, no orthopnea, no palpitations, no PND, no sputum production, no vomiting and no weakness. Shortness of Breath   The history is provided by the patient. This is a recurrent problem. The problem occurs intermittently. The problem has not changed since onset. Associated symptoms include chest pain. Pertinent negatives include no fever, no headaches, no cough, no sputum production, no hemoptysis, no wheezing, no PND, no orthopnea, no vomiting, no abdominal pain, no rash, no leg swelling and no claudication. Precipitated by: exertion. Review of Systems   Constitutional: Negative for chills and fever. HENT: Negative for nosebleeds. Eyes: Negative for blurred vision and double vision. Respiratory: Positive for shortness of breath. Negative for cough, hemoptysis, sputum production and wheezing. Cardiovascular: Positive for chest pain. Negative for palpitations, orthopnea, claudication, leg swelling and PND. Gastrointestinal: Negative for abdominal pain, heartburn, nausea and vomiting. Musculoskeletal: Negative for myalgias. Skin: Negative for rash. Neurological: Negative for dizziness, weakness and headaches. Endo/Heme/Allergies: Does not bruise/bleed easily. Family History   Problem Relation Age of Onset    No Known Problems Mother     No Known Problems Father        Past Medical History:   Diagnosis Date    Anemia     Arthritis     Asthma     Chronic pain     chronic back pain    Chronic sinusitis     Diabetes ([address]     Fibromyalgia     GERD (gastroesophageal reflux disease)     Hepatitis B 10 years ago    Hypertension     Unspecified sleep apnea     uses cpap sometimes       Past Surgical History:   Procedure Laterality Date    HX BREAST LUMPECTOMY  2011    right breast    HX  SECTION      HX CHOLECYSTECTOMY      HX GYN      tubal ligation    HX ORTHOPAEDIC      right knee replacement    HX ORTHOPAEDIC Left     carpal tunnel       Social History   Substance Use Topics    Smoking status: Never Smoker    Smokeless tobacco: Never Used    Alcohol use No       Allergies   Allergen Reactions    Latex, Natural Rubber Rash     Latex tape causes rash to area    Celebrex [Celecoxib] Hives    Iodine Hives    Sulfa (Sulfonamide Antibiotics) Hives       Prior to Admission medications    Medication Sig Start Date End Date Taking? Authorizing Provider   KLOR-CON M10 10 mEq tablet  17  Yes Historical Provider   traMADol (ULTRAM-ER) 200 mg tablet Take 1 Tab by mouth daily. Max Daily Amount: 200 mg. 17  Yes Dolph Endo, NP   cyclobenzaprine (FLEXERIL) 10 mg tablet Take 1 Tab by mouth three (3) times daily as needed.  17  Yes Radha Johnston, NP   amoxicillin-clavulanate (AUGMENTIN) 875-125 mg per tablet  3/21/17  Yes Historical Provider   chlorpheniramine-HYDROcodone (TUSSIONEX) 10-8 mg/5 mL suspension  3/21/17  Yes Historical Provider   oxyCODONE-acetaminophen (PERCOCET) 5-325 mg per tablet  4/13/17  Yes Historical Provider   promethazine (PHENERGAN) 25 mg tablet  4/13/17  Yes Historical Provider   losartan-hydroCHLOROthiazide (HYZAAR) 100-12.5 mg per tablet  2/1/17  Yes Historical Provider   potassium chloride SR (KLOR-CON 10) 10 mEq tablet  2/1/17  Yes Historical Provider   Azelastine (ASTEPRO) 0.15 % (205.5 mcg) nasal spray  10/27/16  Yes Historical Provider   DEXILANT 30 mg capsule  12/1/16  Yes Historical Provider   HYDROcodone-acetaminophen (NORCO) 5-325 mg per tablet  1/25/17  Yes Historical Provider   sodium chloride irrigation 0.9 % irrigation  11/11/16  Yes Historical Provider   traMADol (ULTRAM) 50 mg tablet  12/24/16  Yes Historical Provider   pregabalin (LYRICA) 100 mg capsule Take 1 Cap by mouth three (3) times daily. Max Daily Amount: 300 mg. Indications: FIBROMYALGIA 2/1/17  Yes Albaro Dillon MD   topiramate (TOPAMAX) 25 mg tablet Take 1 Tab by mouth two (2) times a day. 11/29/16  Yes Marc Hernández NP   FARXIGA 5 mg tab tablet Take 5 mg by mouth daily. 8/31/15  Yes Historical Provider   cetirizine (ZYRTEC) 10 mg tablet Take 10 mg by mouth daily. 9/19/15  Yes Historical Provider   metFORMIN (GLUCOPHAGE) 1,000 mg tablet Take 1,000 mg by mouth two (2) times daily (with meals). 9/24/15  Yes Historical Provider   montelukast (SINGULAIR) 10 mg tablet Take 10 mg by mouth daily. 8/3/15  Yes Historical Provider   promethazine (PHENERGAN) 12.5 mg tablet Take 12.5 mg by mouth as needed. 8/3/15  Yes Historical Provider   tiotropium (SPIRIVA WITH HANDIHALER) 18 mcg inhalation capsule Take 1 Cap by inhalation daily. Yes Historical Provider   cloNIDine (CATAPRES) 0.1 mg tablet Take 0.1 mg by mouth nightly. Yes Historical Provider   Dexlansoprazole (DEXILANT) 60 mg CpDM Take  by mouth daily. Yes Historical Provider   losartan-hydrochlorothiazide (HYZAAR) 100-25 mg per tablet Take 1 Tab by mouth daily.      Yes Historical Provider   progesterone (PROMETRIUM) 100 mg capsule Take 100 mg by mouth daily. Yes Historical Provider   albuterol (PROVENTIL HFA, VENTOLIN HFA) 90 mcg/actuation inhaler Take 1 Puff by inhalation every four (4) hours as needed. Yes Historical Provider   ergocalciferol (VITAMIN D2) 50,000 unit capsule Take 50,000 Units by mouth every seven (7) days. Yes Historical Provider   ezetimibe-simvastatin (VYTORIN 10/40) 10-40 mg per tablet Take 1 Tab by mouth nightly. Yes Historical Provider   regadenoson (LEXISCAN) 0.4 mg/5 mL syrg injection 5 mL by IntraVENous route once for 1 dose. 10/26/17 10/26/17  Handy Parish MD   potassium chloride SA (MICRO-K) 10 mEq capsule Take 10 mEq by mouth daily. Historical Provider         Visit Vitals    /79    Pulse 77    Ht 5' 5\" (1.651 m)    Wt 81.6 kg (180 lb)    BMI 29.95 kg/m2       Physical Exam   Constitutional: She is oriented to person, place, and time. She appears well-developed and well-nourished. HENT:   Head: Normocephalic and atraumatic. Eyes: Conjunctivae are normal.   Neck: Neck supple. No JVD present. No tracheal deviation present. No thyromegaly present. Cardiovascular: Normal rate and regular rhythm. PMI is not displaced. Exam reveals no gallop, no S3 and no decreased pulses. Murmur heard. Early systolic murmur is present  at the upper right sternal border  Pulmonary/Chest: No respiratory distress. She has no wheezes. She has no rales. She exhibits no tenderness. Abdominal: Soft. There is no tenderness. Musculoskeletal: She exhibits no edema. Neurological: She is alert and oriented to person, place, and time. Skin: Skin is warm. Psychiatric: She has a normal mood and affect. Ms. Shaan Acevedo has a reminder for a \"due or due soon\" health maintenance. I have asked that she contact her primary care provider for follow-up on this health maintenance. I have personally reviewed patients ekg done at other facility.   I Have personally reviewed recent relevant labs available and discussed with patient  cxr,labs  SUMMARY:echo-10/2017  Left ventricle: Systolic function was normal. Ejection fraction was  estimated in the range of 55 % to 60 %. There were no regional wall motion  abnormalities. There was mild concentric hypertrophy. Doppler parameters  were consistent with abnormal left ventricular relaxation (grade 1  diastolic dysfunction). Mitral valve: There was mild annular calcification. There was trivial  regurgitation. Tricuspid valve: There was trivial regurgitation. NUCLEAR IMAGING:10/2017  Findings:   1. Stress images reveal normal Myoview distrubution in all the LV segments in short axis, vertical and horizontal long axis views. 2. Resting images have a normal uptake. 3. Gated images reveal normal wall motion and the ejection fraction is calculated to be 76%. Conclusion:   1. Normal perfusion scan. 2. Normal wall motion and ejection fraction. 3. No evidence of significant fixed or reversible defect suggesting ischemia or myocardial infarction noted from this nuclear study. 4. Low risk scan. Assessment         ICD-10-CM ICD-9-CM    1. Essential hypertension I10 401.9     mildly elevated post stress test   2. Hyperlipidemia, unspecified hyperlipidemia type E78.5 272.4    3. Chest pain, unspecified type R07.9 786.50     stable  negative stress test   4. Pre-operative cardiovascular examination Z01.810 V72.81     stable cardiac status  ok to proceed with neck surgery-low cardiac risk       There are no discontinued medications. No orders of the defined types were placed in this encounter. Follow-up Disposition:  Return in about 1 year (around 10/26/2018) for Cleared for planned surgery, low risk.

## 2018-10-25 ENCOUNTER — OFFICE VISIT (OUTPATIENT)
Dept: CARDIOLOGY CLINIC | Age: 63
End: 2018-10-25

## 2018-10-25 VITALS
BODY MASS INDEX: 32.49 KG/M2 | SYSTOLIC BLOOD PRESSURE: 145 MMHG | HEIGHT: 65 IN | DIASTOLIC BLOOD PRESSURE: 83 MMHG | WEIGHT: 195 LBS | HEART RATE: 79 BPM

## 2018-10-25 DIAGNOSIS — E78.5 HYPERLIPIDEMIA, UNSPECIFIED HYPERLIPIDEMIA TYPE: ICD-10-CM

## 2018-10-25 DIAGNOSIS — I10 ESSENTIAL HYPERTENSION: ICD-10-CM

## 2018-10-25 DIAGNOSIS — R06.02 SOB (SHORTNESS OF BREATH) ON EXERTION: Primary | ICD-10-CM

## 2018-10-25 RX ORDER — PANTOPRAZOLE SODIUM 20 MG/1
20 TABLET, DELAYED RELEASE ORAL DAILY
COMMUNITY
End: 2020-10-22 | Stop reason: DRUGHIGH

## 2018-10-25 NOTE — LETTER
Earl Ambrose 1955 
 
10/25/2018 Dear MARIKA Huang I had the pleasure of evaluating  Ms. Dhara Marks in office today. Below are the relevant portions of my assessment and plan of care. ICD-10-CM ICD-9-CM 1. SOB (shortness of breath) on exertion R06.02 786.05   
 10/17 - NST normal, Echo with EF 55-60% 2. Essential hypertension I10 401.9 Controlled 3. Hyperlipidemia, unspecified hyperlipidemia type E78.5 272.4 Lipids followed by PCP - on Vytorin Current Outpatient Medications Medication Sig Dispense Refill  pantoprazole (PROTONIX) 20 mg tablet Take 20 mg by mouth daily.  KLOR-CON M10 10 mEq tablet   2  cyclobenzaprine (FLEXERIL) 10 mg tablet Take 1 Tab by mouth three (3) times daily as needed. 90 Tab 2  
 Azelastine (ASTEPRO) 0.15 % (205.5 mcg) nasal spray   12  
 traMADol (ULTRAM) 50 mg tablet every six (6) hours as needed. 0  
 pregabalin (LYRICA) 100 mg capsule Take 1 Cap by mouth three (3) times daily. Max Daily Amount: 300 mg. Indications: FIBROMYALGIA (Patient taking differently: Take 100 mg by mouth two (2) times a day.) 90 Cap 2  
 FARXIGA 5 mg tab tablet Take 5 mg by mouth daily. 5  
 metFORMIN (GLUCOPHAGE) 1,000 mg tablet Take 1,000 mg by mouth two (2) times daily (with meals). 12  
 montelukast (SINGULAIR) 10 mg tablet Take 10 mg by mouth as needed. 4  
 tiotropium (SPIRIVA WITH HANDIHALER) 18 mcg inhalation capsule Take 1 Cap by inhalation daily.  cloNIDine (CATAPRES) 0.1 mg tablet Take 0.1 mg by mouth nightly.  losartan-hydrochlorothiazide (HYZAAR) 100-25 mg per tablet Take 1 Tab by mouth daily.  potassium chloride SA (MICRO-K) 10 mEq capsule Take 10 mEq by mouth daily.  ergocalciferol (VITAMIN D2) 50,000 unit capsule Take 50,000 Units by mouth every seven (7) days.     
 chlorpheniramine-HYDROcodone (TUSSIONEX) 10-8 mg/5 mL suspension   0  
 oxyCODONE-acetaminophen (PERCOCET) 5-325 mg per tablet   0  
  DEXILANT 30 mg capsule   12  topiramate (TOPAMAX) 25 mg tablet Take 1 Tab by mouth two (2) times a day. 60 Tab 1  cetirizine (ZYRTEC) 10 mg tablet Take 10 mg by mouth daily. 5  
 Dexlansoprazole (DEXILANT) 60 mg CpDM Take  by mouth daily.  albuterol (PROVENTIL HFA, VENTOLIN HFA) 90 mcg/actuation inhaler Take 1 Puff by inhalation every four (4) hours as needed.  ezetimibe-simvastatin (VYTORIN 10/40) 10-40 mg per tablet Take 1 Tab by mouth nightly. Orders Placed This Encounter  pantoprazole (PROTONIX) 20 mg tablet Sig: Take 20 mg by mouth daily. If you have questions, please do not hesitate to call me. I look forward to following Ms. Zara Zapata along with you. Sincerely, José Olivia MD

## 2018-10-25 NOTE — PROGRESS NOTES
1. Have you been to the ER, urgent care clinic since your last visit? Hospitalized since your last visit?     no  2. Have you seen or consulted any other health care providers outside of the [address] [place] [address] since your last visit? Include any pap smears or colon screening. Yes Where: [facility]     3. Since your last visit, have you had any of the following symptoms?  no

## 2018-10-25 NOTE — PROGRESS NOTES
HISTORY OF PRESENT ILLNESS  Heber Ramos is a 61 y.o. female. Patient is here for follow up of diagnostic tests. Results will be discussed. Hypertension   The history is provided by the patient. This is a chronic problem. The problem occurs constantly. Associated symptoms include shortness of breath. Pertinent negatives include no chest pain, no abdominal pain and no headaches. Chest Pain (Angina)    The history is provided by the patient. This is a new problem. The current episode started more than 1 week ago. The problem has not changed since onset. The problem occurs every several days. The pain is associated with exertion. The pain is present in the substernal region. The pain is mild. The quality of the pain is described as pressure-like and tightness. The pain does not radiate. Associated symptoms include shortness of breath. Pertinent negatives include no abdominal pain, no claudication, no cough, no dizziness, no fever, no headaches, no hemoptysis, no nausea, no orthopnea, no palpitations, no PND, no sputum production, no vomiting and no weakness. Shortness of Breath   The history is provided by the patient. This is a recurrent problem. The problem occurs intermittently. The problem has not changed since onset. Pertinent negatives include no fever, no headaches, no cough, no sputum production, no hemoptysis, no wheezing, no PND, no orthopnea, no chest pain, no vomiting, no abdominal pain, no rash, no leg swelling and no claudication. Precipitated by: exertion. Review of Systems   Constitutional: Negative for chills and fever. HENT: Negative for nosebleeds. Eyes: Negative for blurred vision and double vision. Respiratory: Positive for shortness of breath. Negative for cough, hemoptysis, sputum production and wheezing. Cardiovascular: Negative for chest pain, palpitations, orthopnea, claudication, leg swelling and PND.    Gastrointestinal: Negative for abdominal pain, heartburn, nausea and vomiting. Musculoskeletal: Positive for joint pain. Negative for myalgias. Skin: Negative for rash. Neurological: Negative for dizziness, weakness and headaches. Endo/Heme/Allergies: Does not bruise/bleed easily. Family History   Problem Relation Age of Onset    No Known Problems Mother     No Known Problems Father        Past Medical History:   Diagnosis Date    Anemia     Arthritis     Asthma     Chronic pain     chronic back pain    Chronic sinusitis     Diabetes ([address]     Fibromyalgia     GERD (gastroesophageal reflux disease)     Hepatitis B 10 years ago    Hypertension     Unspecified sleep apnea     uses cpap sometimes       Past Surgical History:   Procedure Laterality Date    HX BREAST LUMPECTOMY  2011    right breast    HX  SECTION      HX CHOLECYSTECTOMY      HX GYN      tubal ligation    HX ORTHOPAEDIC      right knee replacement    HX ORTHOPAEDIC Left     carpal tunnel       Social History     Tobacco Use    Smoking status: Never Smoker    Smokeless tobacco: Never Used   Substance Use Topics    Alcohol use: No       Allergies   Allergen Reactions    Latex, Natural Rubber Rash     Latex tape causes rash to area    Celebrex [Celecoxib] Hives    Iodine Hives    Sulfa (Sulfonamide Antibiotics) Hives       Prior to Admission medications    Medication Sig Start Date End Date Taking? Authorizing Provider   KLOR-CON M10 10 mEq tablet  17   Provider, Historical   traMADol (ULTRAM-ER) 200 mg tablet Take 1 Tab by mouth daily. Max Daily Amount: 200 mg. 17   Hollandyris Mccallum E, NP   cyclobenzaprine (FLEXERIL) 10 mg tablet Take 1 Tab by mouth three (3) times daily as needed.  17   Castillo Livingston NP   amoxicillin-clavulanate (AUGMENTIN) 875-125 mg per tablet  3/21/17   Provider, Historical   chlorpheniramine-HYDROcodone (TUSSIONEX) 10-8 mg/5 mL suspension  3/21/17   Provider, Historical   oxyCODONE-acetaminophen (PERCOCET) 5-325 mg per tablet  17 Provider, Historical   promethazine (PHENERGAN) 25 mg tablet  4/13/17   Provider, Historical   losartan-hydroCHLOROthiazide (HYZAAR) 100-12.5 mg per tablet  2/1/17   Provider, Historical   potassium chloride SR (KLOR-CON 10) 10 mEq tablet  2/1/17   Provider, Historical   Azelastine (ASTEPRO) 0.15 % (205.5 mcg) nasal spray  10/27/16   Provider, Historical   DEXILANT 30 mg capsule  12/1/16   Provider, Historical   HYDROcodone-acetaminophen ([facility]) 5-325 mg per tablet  1/25/17   Provider, Historical   sodium chloride irrigation 0.9 % irrigation  11/11/16   Provider, Historical   traMADol (ULTRAM) 50 mg tablet  12/24/16   Provider, Historical   pregabalin (LYRICA) 100 mg capsule Take 1 Cap by mouth three (3) times daily. Max Daily Amount: 300 mg. Indications: FIBROMYALGIA 2/1/17   Norm Wylie MD   topiramate (TOPAMAX) 25 mg tablet Take 1 Tab by mouth two (2) times a day. 11/29/16   Livia Moreno NP   FARXIGA 5 mg tab tablet Take 5 mg by mouth daily. 8/31/15   Provider, Historical   cetirizine (ZYRTEC) 10 mg tablet Take 10 mg by mouth daily. 9/19/15   Provider, Historical   metFORMIN (GLUCOPHAGE) 1,000 mg tablet Take 1,000 mg by mouth two (2) times daily (with meals). 9/24/15   Provider, Historical   montelukast (SINGULAIR) 10 mg tablet Take 10 mg by mouth daily. 8/3/15   Provider, Historical   promethazine (PHENERGAN) 12.5 mg tablet Take 12.5 mg by mouth as needed. 8/3/15   Provider, Historical   tiotropium (SPIRIVA WITH HANDIHALER) 18 mcg inhalation capsule Take 1 Cap by inhalation daily. Provider, Historical   cloNIDine (CATAPRES) 0.1 mg tablet Take 0.1 mg by mouth nightly. Provider, Historical   Dexlansoprazole (DEXILANT) 60 mg CpDM Take  by mouth daily. Provider, Historical   losartan-hydrochlorothiazide (HYZAAR) 100-25 mg per tablet Take 1 Tab by mouth daily. Provider, Historical   potassium chloride SA (MICRO-K) 10 mEq capsule Take 10 mEq by mouth daily.       Provider, Historical   progesterone (PROMETRIUM) 100 mg capsule Take 100 mg by mouth daily. Provider, Historical   albuterol (PROVENTIL HFA, VENTOLIN HFA) 90 mcg/actuation inhaler Take 1 Puff by inhalation every four (4) hours as needed. Provider, Historical   ergocalciferol (VITAMIN D2) 50,000 unit capsule Take 50,000 Units by mouth every seven (7) days. Provider, Historical   ezetimibe-simvastatin (VYTORIN 10/40) 10-40 mg per tablet Take 1 Tab by mouth nightly. Provider, Historical         There were no vitals taken for this visit. Physical Exam   Constitutional: She is oriented to person, place, and time. She appears well-developed and well-nourished. HENT:   Head: Normocephalic and atraumatic. Eyes: Conjunctivae are normal.   Neck: Neck supple. No JVD present. No tracheal deviation present. No thyromegaly present. Cardiovascular: Normal rate and regular rhythm. PMI is not displaced. Exam reveals no gallop, no S3 and no decreased pulses. Murmur heard. Early systolic murmur is present at the upper right sternal border. Pulmonary/Chest: No respiratory distress. She has no wheezes. She has no rales. She exhibits no tenderness. Abdominal: Soft. There is no tenderness. Musculoskeletal: She exhibits no edema. Neurological: She is alert and oriented to person, place, and time. Skin: Skin is warm. Psychiatric: She has a normal mood and affect. Ms. Michelle Valles has a reminder for a \"due or due soon\" health maintenance. I have asked that she contact her primary care provider for follow-up on this health maintenance. I have personally reviewed patients ekg done at other facility. I Have personally reviewed recent relevant labs available and discussed with patient  cxr,labs  SUMMARY:echo-10/2017  Left ventricle: Systolic function was normal. Ejection fraction was  estimated in the range of 55 % to 60 %. There were no regional wall motion  abnormalities. There was mild concentric hypertrophy.  Doppler parameters  were consistent with abnormal left ventricular relaxation (grade 1  diastolic dysfunction). Mitral valve: There was mild annular calcification. There was trivial  regurgitation. Tricuspid valve: There was trivial regurgitation. NUCLEAR IMAGING:10/2017  Findings:   1. Stress images reveal normal Myoview distrubution in all the LV segments in short axis, vertical and horizontal long axis views. 2. Resting images have a normal uptake. 3. Gated images reveal normal wall motion and the ejection fraction is calculated to be 76%. Conclusion:   1. Normal perfusion scan. 2. Normal wall motion and ejection fraction. 3. No evidence of significant fixed or reversible defect suggesting ischemia or myocardial infarction noted from this nuclear study. 4. Low risk scan. Assessment           ICD-10-CM ICD-9-CM    1. SOB (shortness of breath) on exertion R06.02 786.05    2. Essential hypertension I10 401.9    3. Hyperlipidemia, unspecified hyperlipidemia type E78.5 272.4      10/2018 -Stable, SOB with exertion at times. Normal NST and echo 10/2017. B/P elevated, has been under increased stress due to 's illness. To monitor b/p at home. Lipids followed by PCP. There are no discontinued medications. No orders of the defined types were placed in this encounter. Follow-up Disposition: Not on File    I have independently evaluated taken history and examined the patient. All relevant labs and testing data's are reviewed. Care plan discussed and updated after review.   Arthur Brewster MD

## 2018-10-25 NOTE — PATIENT INSTRUCTIONS
Monitor b/p at home and call if consistently greater than 140    Follow up in 1 year or sooner if needed. Heart-Healthy Diet: Care Instructions  Your Care Instructions    A heart-healthy diet has lots of vegetables, fruits, nuts, beans, and whole grains, and is low in salt. It limits foods that are high in saturated fat, such as meats, cheeses, and fried foods. It may be hard to change your diet, but even small changes can lower your risk of heart attack and heart disease. Follow-up care is a key part of your treatment and safety. Be sure to make and go to all appointments, and call your doctor if you are having problems. It's also a good idea to know your test results and keep a list of the medicines you take. How can you care for yourself at home? Watch your portions  · Learn what a serving is. A \"serving\" and a \"portion\" are not always the same thing. Make sure that you are not eating larger portions than are recommended. For example, a serving of pasta is ½ cup. A serving size of meat is 2 to 3 ounces. A 3-ounce serving is about the size of a deck of cards. Measure serving sizes until you are good at [place]" them. Keep in mind that restaurants often serve portions that are 2 or 3 times the size of one serving. · To keep your energy level up and keep you from feeling hungry, eat often but in smaller portions. · Eat only the number of calories you need to stay at a healthy weight. If you need to lose weight, eat fewer calories than your body burns (through exercise and other physical activity). Eat more fruits and vegetables  · Eat a variety of fruit and vegetables every day. Dark green, deep orange, red, or yellow fruits and vegetables are especially good for you. Examples include spinach, carrots, peaches, and berries. · Keep carrots, celery, and other veggies handy for snacks. Buy fruit that is in season and store it where you can see it so that you will be tempted to eat it.   · Deanne Faith dishes that have a lot of veggies in them, such as stir-fries and soups. Limit saturated and trans fat  · Read food labels, and try to avoid saturated and trans fats. They increase your risk of heart disease. Trans fat is found in many processed foods such as cookies and crackers. · Use olive or canola oil when you cook. Try cholesterol-lowering spreads, such as Benecol or Take Control. · Bake, broil, grill, or steam foods instead of frying them. · Choose lean meats instead of high-fat meats such as hot dogs and sausages. Cut off all visible fat when you prepare meat. · Eat fish, skinless poultry, and meat alternatives such as soy products instead of high-fat meats. Soy products, such as tofu, may be especially good for your heart. · Choose low-fat or fat-free milk and dairy products. Eat fish  · Eat at least two servings of fish a week. Certain fish, such as salmon and tuna, contain omega-3 fatty acids, which may help reduce your risk of heart attack. Eat foods high in fiber  · Eat a variety of grain products every day. Include whole-grain foods that have lots of fiber and nutrients. Examples of whole-grain foods include oats, whole wheat bread, and brown rice. · Buy whole-grain breads and cereals, instead of white bread or pastries. Limit salt and sodium  · Limit how much salt and sodium you eat to help lower your blood pressure. · Taste food before you salt it. Add only a little salt when you think you need it. With time, your taste buds will adjust to less salt. · Eat fewer snack items, fast foods, and other high-salt, processed foods. Check food labels for the amount of sodium in packaged foods. · Choose low-sodium versions of canned goods (such as soups, vegetables, and beans). Limit sugar  · Limit drinks and foods with added sugar. These include candy, desserts, and soda pop. Limit alcohol  · Limit alcohol to no more than 2 drinks a day for men and 1 drink a day for women.  Too much alcohol can cause health problems. When should you call for help? Watch closely for changes in your health, and be sure to contact your doctor if:    · You would like help planning heart-healthy meals. Where can you learn more? Go to http://anaya-weston.info/. Enter V137 in the search box to learn more about \"Heart-Healthy Diet: Care Instructions. \"  Current as of: December 6, 2017  Content Version: 11.8  © 1428-8799 Healthwise, [employer]. Care instructions adapted under license by [employer] [address] (which disclaims liability or warranty for this information). If you have questions about a medical condition or this instruction, always ask your healthcare professional. [address] any warranty or liability for your use of this information.

## 2019-05-15 ENCOUNTER — HOSPITAL ENCOUNTER (OUTPATIENT)
Dept: GENERAL RADIOLOGY | Age: 64
Discharge: HOME | End: 2019-05-15
Payer: MEDICARE

## 2019-05-15 DIAGNOSIS — M25.562 LEFT KNEE PAIN: ICD-10-CM

## 2019-05-15 PROCEDURE — 73562 X-RAY EXAM OF KNEE 3: CPT

## 2019-06-19 ENCOUNTER — OFFICE VISIT (OUTPATIENT)
Dept: ORTHOPEDIC SURGERY | Age: 64
End: 2019-06-19

## 2019-06-19 VITALS
WEIGHT: 191 LBS | HEIGHT: 65 IN | HEART RATE: 78 BPM | RESPIRATION RATE: 16 BRPM | OXYGEN SATURATION: 98 % | DIASTOLIC BLOOD PRESSURE: 81 MMHG | BODY MASS INDEX: 31.82 KG/M2 | SYSTOLIC BLOOD PRESSURE: 123 MMHG

## 2019-06-19 DIAGNOSIS — M17.12 PRIMARY OSTEOARTHRITIS OF LEFT KNEE: Primary | ICD-10-CM

## 2019-06-19 RX ORDER — DICLOFENAC SODIUM 10 MG/G
2 GEL TOPICAL 4 TIMES DAILY
Qty: 100 G | Refills: 2 | Status: SHIPPED | OUTPATIENT
Start: 2019-06-19 | End: 2020-10-19

## 2019-06-19 RX ORDER — DICLOFENAC SODIUM 50 MG/1
50 TABLET, DELAYED RELEASE ORAL 2 TIMES DAILY WITH MEALS
Qty: 120 TAB | Refills: 2 | Status: SHIPPED | OUTPATIENT
Start: 2019-06-19 | End: 2020-01-06

## 2019-06-19 NOTE — PROGRESS NOTES
Patient: Maya Vu                MRN: [id number]       SSN: [id number]  YOB: 1955        AGE: 59 y.o. SEX: female  Body mass index is 31.78 kg/m². PCP: MARIKA Ryan  06/19/19    Chief Complaint: Left knee pain    HISTORY OF PRESENT ILLNESS:  Cece Meyer is a 59year-old female who presents to the office today with left knee pain. She has had pain for years. It has recently gotten worse. It got to the point where she was unable to walk recently and was taken by ambulance to the ER. X-rays were taken there, which show arthritic changes in the left knee. She has had her right knee replaced several years ago. She takes ibuprofen. She is not on any prescription strength antiinflammatories orally or creams. She has not had any cortisone shots. Past Medical History:   Diagnosis Date    Anemia     Arthritis     Asthma     Chronic pain     chronic back pain    Chronic sinusitis     Diabetes (HCC)     Fibromyalgia     GERD (gastroesophageal reflux disease)     Hepatitis B 10 years ago    Hypertension     Unspecified sleep apnea     uses cpap sometimes       Family History   Problem Relation Age of Onset    No Known Problems Mother     No Known Problems Father        Current Outpatient Medications   Medication Sig Dispense Refill    diclofenac EC (VOLTAREN) 50 mg EC tablet Take 1 Tab by mouth two (2) times daily (with meals). 120 Tab 2    diclofenac (VOLTAREN) 1 % gel Apply 2 g to affected area four (4) times daily. 100 g 2    pantoprazole (PROTONIX) 20 mg tablet Take 20 mg by mouth daily.  KLOR-CON M10 10 mEq tablet   2    cyclobenzaprine (FLEXERIL) 10 mg tablet Take 1 Tab by mouth three (3) times daily as needed.  90 Tab 2    chlorpheniramine-HYDROcodone (TUSSIONEX) 10-8 mg/5 mL suspension   0    oxyCODONE-acetaminophen (PERCOCET) 5-325 mg per tablet   0    Azelastine (ASTEPRO) 0.15 % (205.5 mcg) nasal spray   12    DEXILANT 30 mg capsule   12    traMADol (ULTRAM) 50 mg tablet every six (6) hours as needed. 0    pregabalin (LYRICA) 100 mg capsule Take 1 Cap by mouth three (3) times daily. Max Daily Amount: 300 mg. Indications: FIBROMYALGIA (Patient taking differently: Take 100 mg by mouth two (2) times a day.) 90 Cap 2    topiramate (TOPAMAX) 25 mg tablet Take 1 Tab by mouth two (2) times a day. 60 Tab 1    FARXIGA 5 mg tab tablet Take 5 mg by mouth daily. 5    cetirizine (ZYRTEC) 10 mg tablet Take 10 mg by mouth daily. 5    metFORMIN (GLUCOPHAGE) 1,000 mg tablet Take 1,000 mg by mouth two (2) times daily (with meals). 12    montelukast (SINGULAIR) 10 mg tablet Take 10 mg by mouth as needed. 4    tiotropium (SPIRIVA WITH HANDIHALER) 18 mcg inhalation capsule Take 1 Cap by inhalation daily.  cloNIDine (CATAPRES) 0.1 mg tablet Take 0.1 mg by mouth nightly.  Dexlansoprazole (DEXILANT) 60 mg CpDM Take  by mouth daily.  losartan-hydrochlorothiazide (HYZAAR) 100-25 mg per tablet Take 1 Tab by mouth daily.  potassium chloride SA (MICRO-K) 10 mEq capsule Take 10 mEq by mouth daily.  albuterol (PROVENTIL HFA, VENTOLIN HFA) 90 mcg/actuation inhaler Take 1 Puff by inhalation every four (4) hours as needed.  ergocalciferol (VITAMIN D2) 50,000 unit capsule Take 50,000 Units by mouth every seven (7) days.  ezetimibe-simvastatin (VYTORIN 10/40) 10-40 mg per tablet Take 1 Tab by mouth nightly.            Allergies   Allergen Reactions    Latex, Natural Rubber Rash     Latex tape causes rash to area    Celebrex [Celecoxib] Hives    Iodine Hives    Sulfa (Sulfonamide Antibiotics) Hives       Past Surgical History:   Procedure Laterality Date    HX BREAST LUMPECTOMY  2011    right breast    HX  SECTION      HX CHOLECYSTECTOMY      HX GYN      tubal ligation    HX ORTHOPAEDIC      right knee replacement    HX ORTHOPAEDIC Left     carpal tunnel       Social History     Socioeconomic History    Marital status:      Spouse name: Not on file    Number of children: Not on file    Years of education: Not on file    Highest education level: Not on file   Occupational History    Occupation: disability     Comment: since 2009   Social Needs    Financial resource strain: Not on file    Food insecurity:     Worry: Not on file     Inability: Not on file   [employer] needs:     Medical: Not on file     Non-medical: Not on file   Tobacco Use    Smoking status: Never Smoker    Smokeless tobacco: Never Used   Substance and Sexual Activity    Alcohol use: No    Drug use: No    Sexual activity: Not on file   Lifestyle    Physical activity:     Days per week: Not on file     Minutes per session: Not on file    Stress: Not on file   Relationships    Social connections:     Talks on phone: Not on file     Gets together: Not on file     Attends Tenriism service: Not on file     Active member of club or organization: Not on file     Attends meetings of clubs or organizations: Not on file     Relationship status: Not on file    Intimate partner violence:     Fear of current or ex partner: Not on file     Emotionally abused: Not on file     Physically abused: Not on file     Forced sexual activity: Not on file   Other Topics Concern    Not on file   Social History Narrative    Not on file       REVIEW OF SYSTEMS:      CON: negative for recent weight loss/gain, fever, or chills  EYE: negative for double or blurry vision  ENT: negative for hoarseness  RS:   negative for cough, URI, SOB  CV:  negative for chest pain, palpitations  GI:    negative for blood in stool, nausea/vomiting  :  negative for blood in urine  MS: As per HPI  Other systems reviewed and noted below. PHYSICAL EXAMINATION:  Visit Vitals  /81   Pulse 78   Resp 16   Ht 5' 5\" (1.651 m)   Wt 191 lb (86.6 kg)   SpO2 98%   BMI 31.78 kg/m²     Body mass index is 31.78 kg/m².     GENERAL: Alert and oriented x3, in no acute distress, well-developed, well-nourished. HEENT: Normocephalic, atraumatic. RESP: Non labored breathing with equal chest rise on inspiration. CV: Well perfused extremities. No cyanosis or clubbing noted. ABDOMEN: Soft, non-tender, non-distended. PHYSICAL EXAM:  Physical exam of the left knee with full knee range of motion. Crepitus with knee range of motion. No obvious deformity. She is tender to palpation along the medial and lateral joint line. She has pain with Mayra's testing. She is otherwise neurovascularly intact. IMAGING:  X-rays of the left knee were reviewed from the ER. These show severe tricompartmental arthritic changes. ASSESSMENT AND PLAN:   Luís Cuba is a 59year-old female with left knee arthritis. I had a lengthy discussion with her today regarding her treatment options from conservative to surgical treatment. She does not want to have a total knee replacement at this time. We will start with conservative treatment in the form of an oral antiinflammatory prescription, as well as a topical antiinflammatory. If this is unsuccessful in alleviating her pain, her next step may be a cortisone shot or a referral to see one of our specialists to have her knee replaced.                 Electronically signed by: Monique Dandy, MD

## 2019-06-28 ENCOUNTER — TELEPHONE (OUTPATIENT)
Dept: ORTHOPEDIC SURGERY | Facility: CLINIC | Age: 64
End: 2019-06-28

## 2019-06-28 NOTE — TELEPHONE ENCOUNTER
Please use Key: [id number] to initiate a prior authorization for Diclofenac gel through [employer] or contact the pharmacy to change the medication.

## 2019-10-07 ENCOUNTER — OFFICE VISIT (OUTPATIENT)
Dept: CARDIOLOGY CLINIC | Age: 64
End: 2019-10-07

## 2019-10-07 VITALS
SYSTOLIC BLOOD PRESSURE: 137 MMHG | WEIGHT: 198 LBS | HEART RATE: 63 BPM | BODY MASS INDEX: 38.87 KG/M2 | DIASTOLIC BLOOD PRESSURE: 73 MMHG | HEIGHT: 60 IN

## 2019-10-07 DIAGNOSIS — E78.5 HYPERLIPIDEMIA, UNSPECIFIED HYPERLIPIDEMIA TYPE: ICD-10-CM

## 2019-10-07 DIAGNOSIS — I10 ESSENTIAL HYPERTENSION: Primary | ICD-10-CM

## 2019-10-07 DIAGNOSIS — R06.02 SOB (SHORTNESS OF BREATH) ON EXERTION: ICD-10-CM

## 2019-10-07 DIAGNOSIS — E66.01 CLASS 2 SEVERE OBESITY WITH SERIOUS COMORBIDITY AND BODY MASS INDEX (BMI) OF 38.0 TO 38.9 IN ADULT, UNSPECIFIED OBESITY TYPE (HCC): ICD-10-CM

## 2019-10-07 NOTE — PROGRESS NOTES
HISTORY OF PRESENT ILLNESS  Gautam Matson is a 59 y.o. female. Hypertension   The history is provided by the patient. This is a chronic problem. The problem occurs constantly. Associated symptoms include shortness of breath. Pertinent negatives include no chest pain, no abdominal pain and no headaches. Chest Pain (Angina)    The history is provided by the patient. This is a new problem. The current episode started more than 1 week ago. The problem has not changed since onset. The problem occurs every several days. The pain is associated with exertion. The pain is present in the substernal region. The pain is mild. The quality of the pain is described as pressure-like and tightness. The pain does not radiate. Associated symptoms include shortness of breath. Pertinent negatives include no abdominal pain, no claudication, no cough, no dizziness, no fever, no headaches, no hemoptysis, no nausea, no orthopnea, no palpitations, no PND, no sputum production, no vomiting and no weakness. Shortness of Breath   The history is provided by the patient. This is a recurrent problem. The problem occurs intermittently. The problem has not changed since onset. Pertinent negatives include no fever, no headaches, no cough, no sputum production, no hemoptysis, no wheezing, no PND, no orthopnea, no chest pain, no vomiting, no abdominal pain, no rash, no leg swelling and no claudication. Precipitated by: exertion. Review of Systems   Constitutional: Negative for chills and fever. HENT: Negative for nosebleeds. Eyes: Negative for blurred vision and double vision. Respiratory: Positive for shortness of breath. Negative for cough, hemoptysis, sputum production and wheezing. Cardiovascular: Negative for chest pain, palpitations, orthopnea, claudication, leg swelling and PND. Gastrointestinal: Negative for abdominal pain, heartburn, nausea and vomiting. Musculoskeletal: Positive for joint pain. Negative for myalgias. Skin: Negative for rash. Neurological: Negative for dizziness, weakness and headaches. Endo/Heme/Allergies: Does not bruise/bleed easily. Family History   Problem Relation Age of Onset    No Known Problems Mother     No Known Problems Father        Past Medical History:   Diagnosis Date    Anemia     Arthritis     Asthma     Chronic pain     chronic back pain    Chronic sinusitis     Diabetes ([address]     Fibromyalgia     GERD (gastroesophageal reflux disease)     Hepatitis B 10 years ago    Hypertension     Unspecified sleep apnea     uses cpap sometimes       Past Surgical History:   Procedure Laterality Date    HX BREAST LUMPECTOMY  2011    right breast    HX  SECTION      HX CHOLECYSTECTOMY      HX GYN      tubal ligation    HX ORTHOPAEDIC      right knee replacement    HX ORTHOPAEDIC Left     carpal tunnel       Social History     Tobacco Use    Smoking status: Never Smoker    Smokeless tobacco: Never Used   Substance Use Topics    Alcohol use: No       Allergies   Allergen Reactions    Latex, Natural Rubber Rash     Latex tape causes rash to area    Celebrex [Celecoxib] Hives    Iodine Hives    Sulfa (Sulfonamide Antibiotics) Hives       Prior to Admission medications    Medication Sig Start Date End Date Taking? Authorizing Provider   diclofenac EC (VOLTAREN) 50 mg EC tablet Take 1 Tab by mouth two (2) times daily (with meals). 19  Yes Bong Rousseau MD   diclofenac (VOLTAREN) 1 % gel Apply 2 g to affected area four (4) times daily. 19  Yes Bong Rousseau MD   pantoprazole (PROTONIX) 20 mg tablet Take 20 mg by mouth daily. Yes Provider, Historical   KLOR-CON M10 10 mEq tablet  17  Yes Provider, Historical   cyclobenzaprine (FLEXERIL) 10 mg tablet Take 1 Tab by mouth three (3) times daily as needed.  17  Yes Lily TAYLOR NP   oxyCODONE-acetaminophen (PERCOCET) 5-325 mg per tablet  17  Yes Provider, Historical   Azelastine (ASTEPRO) 0.15 % (205.5 mcg) nasal spray  10/27/16  Yes Provider, Historical   traMADol (ULTRAM) 50 mg tablet every six (6) hours as needed. 12/24/16  Yes Provider, Historical   pregabalin (LYRICA) 100 mg capsule Take 1 Cap by mouth three (3) times daily. Max Daily Amount: 300 mg. Indications: FIBROMYALGIA  Patient taking differently: Take 100 mg by mouth two (2) times a day. 2/1/17  Yes Vivek Diego MD   FARXIGA 5 mg tab tablet Take 5 mg by mouth daily. 8/31/15  Yes Provider, Historical   cetirizine (ZYRTEC) 10 mg tablet Take 10 mg by mouth daily. 9/19/15  Yes Provider, Historical   metFORMIN (GLUCOPHAGE) 1,000 mg tablet Take 1,000 mg by mouth two (2) times daily (with meals). 9/24/15  Yes Provider, Historical   montelukast (SINGULAIR) 10 mg tablet Take 10 mg by mouth as needed. 8/3/15  Yes Provider, Historical   tiotropium (SPIRIVA WITH HANDIHALER) 18 mcg inhalation capsule Take 1 Cap by inhalation daily. Yes Provider, Historical   cloNIDine (CATAPRES) 0.1 mg tablet Take 0.1 mg by mouth nightly. Yes Provider, Historical   losartan-hydrochlorothiazide (HYZAAR) 100-25 mg per tablet Take 1 Tab by mouth daily. Yes Provider, Historical   potassium chloride SA (MICRO-K) 10 mEq capsule Take 10 mEq by mouth daily. Yes Provider, Historical   ergocalciferol (VITAMIN D2) 50,000 unit capsule Take 50,000 Units by mouth every seven (7) days. Yes Provider, Historical         Visit Vitals  /73   Pulse 63   Ht 5' (1.524 m)   Wt 89.8 kg (198 lb)   BMI 38.67 kg/m²       Physical Exam   Constitutional: She is oriented to person, place, and time. She appears well-developed and well-nourished. HENT:   Head: Normocephalic and atraumatic. Eyes: Conjunctivae are normal.   Neck: Neck supple. No JVD present. No tracheal deviation present. No thyromegaly present. Cardiovascular: Normal rate and regular rhythm. PMI is not displaced. Exam reveals no gallop, no S3 and no decreased pulses. Murmur heard.    Early systolic murmur is present at the upper right sternal border. Pulmonary/Chest: No respiratory distress. She has no wheezes. She has no rales. She exhibits no tenderness. Abdominal: Soft. There is no tenderness. Musculoskeletal: She exhibits no edema. Neurological: She is alert and oriented to person, place, and time. Skin: Skin is warm. Psychiatric: She has a normal mood and affect. Ms. Verena Valladares has a reminder for a \"due or due soon\" health maintenance. I have asked that she contact her primary care provider for follow-up on this health maintenance. I have personally reviewed patients ekg done at other facility. I Have personally reviewed recent relevant labs available and discussed with patient  cxr,labs  SUMMARY:echo-10/2017  Left ventricle: Systolic function was normal. Ejection fraction was  estimated in the range of 55 % to 60 %. There were no regional wall motion  abnormalities. There was mild concentric hypertrophy. Doppler parameters  were consistent with abnormal left ventricular relaxation (grade 1  diastolic dysfunction). Mitral valve: There was mild annular calcification. There was trivial  regurgitation. Tricuspid valve: There was trivial regurgitation. NUCLEAR IMAGING:10/2017  Findings:   1. Stress images reveal normal Myoview distrubution in all the LV segments in short axis, vertical and horizontal long axis views. 2. Resting images have a normal uptake. 3. Gated images reveal normal wall motion and the ejection fraction is calculated to be 76%. Conclusion:   1. Normal perfusion scan. 2. Normal wall motion and ejection fraction. 3. No evidence of significant fixed or reversible defect suggesting ischemia or myocardial infarction noted from this nuclear study. 4. Low risk scan. Assessment           ICD-10-CM ICD-9-CM    1. Essential hypertension I10 401.9     Stable continue current medical management   2.  Hyperlipidemia, unspecified hyperlipidemia type E78.5 272.4 LIPID PANEL      HEPATIC FUNCTION PANEL    Currently not on any cholesterol medication. Check lipid and decide on treatment   3. SOB (shortness of breath) on exertion R06.02 786.05     Stable. Class II monitor   4. Class 2 severe obesity with serious comorbidity and body mass index (BMI) of 38.0 to 38.9 in adult, unspecified obesity type ([facility] E66.01 278.01     Z68.38 V85.38     Continue with diet and exercise. 10/2018 -Stable, SOB with exertion at times. Normal NST and echo 10/2017. B/P elevated, has been under increased stress due to 's illness. To monitor b/p at home. Lipids followed by PCP. 10/2019  Stable shortness of breath exertion and atypical chest pain. Has noticed weight gain continue with diet and exercise. Currently not on any lipid-lowering agent.   Check lipids and consider statin if LDL is high  Medications Discontinued During This Encounter   Medication Reason    ezetimibe-simvastatin (VYTORIN 10/40) 10-40 mg per tablet Not A Current Medication    albuterol (PROVENTIL HFA, VENTOLIN HFA) 90 mcg/actuation inhaler Not A Current Medication    Dexlansoprazole (DEXILANT) 60 mg CpDM Not A Current Medication    DEXILANT 30 mg capsule Not A Current Medication    topiramate (TOPAMAX) 25 mg tablet Not A Current Medication    chlorpheniramine-HYDROcodone (TUSSIONEX) 10-8 mg/5 mL suspension Not A Current Medication       Orders Placed This Encounter    LIPID PANEL     Standing Status:   Future     Standing Expiration Date:   4/6/2020    HEPATIC FUNCTION PANEL     Standing Status:   Future     Standing Expiration Date:   4/6/2020         Teresa Cordoba MD

## 2019-10-08 LAB
A-G RATIO,AGRAT: 1.5 RATIO (ref 1.1–2.6)
ALBUMIN SERPL-MCNC: 4.4 G/DL (ref 3.5–5)
ALP SERPL-CCNC: 70 U/L (ref 40–120)
ALT SERPL-CCNC: 10 U/L (ref 5–40)
AST SERPL W P-5'-P-CCNC: 13 U/L (ref 10–37)
BILIRUB SERPL-MCNC: 0.2 MG/DL (ref 0.2–1.2)
BILIRUBIN, DIRECT,CBIL: <0.2 MG/DL (ref 0–0.3)
CHOLEST SERPL-MCNC: 206 MG/DL (ref 110–200)
GLOBULIN,GLOB: 3 G/DL (ref 2–4)
HDLC SERPL-MCNC: 3.4 MG/DL (ref 0–5)
HDLC SERPL-MCNC: 61 MG/DL (ref 40–59)
LDLC SERPL CALC-MCNC: 127 MG/DL (ref 50–99)
PROT SERPL-MCNC: 7.4 G/DL (ref 6.2–8.1)
TRIGL SERPL-MCNC: 93 MG/DL (ref 40–149)
VLDLC SERPL CALC-MCNC: 19 MG/DL (ref 8–30)

## 2019-10-09 DIAGNOSIS — E78.5 HYPERLIPIDEMIA, UNSPECIFIED HYPERLIPIDEMIA TYPE: Primary | ICD-10-CM

## 2019-10-09 DIAGNOSIS — R06.02 SOB (SHORTNESS OF BREATH) ON EXERTION: ICD-10-CM

## 2019-10-09 RX ORDER — ATORVASTATIN CALCIUM 20 MG/1
20 TABLET, FILM COATED ORAL DAILY
Qty: 30 TAB | Refills: 3 | Status: SHIPPED | OUTPATIENT
Start: 2019-10-09 | End: 2020-02-10

## 2019-10-09 NOTE — TELEPHONE ENCOUNTER
----- Message from Champ Mahan MD sent at 10/9/2019  8:43 AM EDT -----  High LDL. Start atorvastatin 20 mg a day.   Lipid LFT 6-week

## 2019-10-09 NOTE — TELEPHONE ENCOUNTER
Called and left message on patient voicemail regarding lab/test per Dr. Tierney Ruiz, lab reviewed High LDL. Start atorvastatin 20 mg a day. Lipid/LFT 6 weeks. If any questions to call office.

## 2019-11-08 LAB
A-G RATIO,AGRAT: 1.7 RATIO (ref 1.1–2.6)
ALBUMIN SERPL-MCNC: 4.7 G/DL (ref 3.5–5)
ALP SERPL-CCNC: 66 U/L (ref 40–120)
ALT SERPL-CCNC: 11 U/L (ref 5–40)
AST SERPL W P-5'-P-CCNC: 15 U/L (ref 10–37)
BILIRUB SERPL-MCNC: 0.1 MG/DL (ref 0.2–1.2)
BILIRUBIN, DIRECT,CBIL: <0.2 MG/DL (ref 0–0.3)
CHOLEST SERPL-MCNC: 138 MG/DL (ref 110–200)
GLOBULIN,GLOB: 2.7 G/DL (ref 2–4)
HDLC SERPL-MCNC: 2.3 MG/DL (ref 0–5)
HDLC SERPL-MCNC: 61 MG/DL (ref 40–59)
LDLC SERPL CALC-MCNC: 68 MG/DL (ref 50–99)
PROT SERPL-MCNC: 7.4 G/DL (ref 6.2–8.1)
TRIGL SERPL-MCNC: 46 MG/DL (ref 40–149)
VLDLC SERPL CALC-MCNC: 9 MG/DL (ref 8–30)

## 2019-12-30 DIAGNOSIS — M17.12 PRIMARY OSTEOARTHRITIS OF LEFT KNEE: ICD-10-CM

## 2020-01-06 RX ORDER — DICLOFENAC SODIUM 50 MG/1
TABLET, DELAYED RELEASE ORAL
Qty: 180 TAB | Refills: 1 | Status: SHIPPED | OUTPATIENT
Start: 2020-01-06 | End: 2020-07-27

## 2020-02-09 DIAGNOSIS — E78.5 HYPERLIPIDEMIA, UNSPECIFIED HYPERLIPIDEMIA TYPE: ICD-10-CM

## 2020-02-10 RX ORDER — ATORVASTATIN CALCIUM 20 MG/1
TABLET, FILM COATED ORAL
Qty: 90 TAB | Refills: 1 | Status: SHIPPED | OUTPATIENT
Start: 2020-02-10 | End: 2020-09-08

## 2020-03-21 ENCOUNTER — HOSPITAL ENCOUNTER (EMERGENCY)
Age: 65
Discharge: HOME | End: 2020-03-21
Attending: EMERGENCY MEDICINE
Payer: MEDICARE

## 2020-03-21 ENCOUNTER — APPOINTMENT (OUTPATIENT)
Dept: CT IMAGING | Age: 65
End: 2020-03-21
Attending: PHYSICIAN ASSISTANT
Payer: MEDICARE

## 2020-03-21 ENCOUNTER — APPOINTMENT (OUTPATIENT)
Dept: GENERAL RADIOLOGY | Age: 65
End: 2020-03-21
Attending: EMERGENCY MEDICINE
Payer: MEDICARE

## 2020-03-21 VITALS
SYSTOLIC BLOOD PRESSURE: 141 MMHG | OXYGEN SATURATION: 100 % | TEMPERATURE: 97.7 F | RESPIRATION RATE: 18 BRPM | DIASTOLIC BLOOD PRESSURE: 87 MMHG | HEART RATE: 72 BPM

## 2020-03-21 DIAGNOSIS — E87.6 HYPOKALEMIA: ICD-10-CM

## 2020-03-21 DIAGNOSIS — R10.9 ABDOMINAL PAIN, UNSPECIFIED ABDOMINAL LOCATION: ICD-10-CM

## 2020-03-21 DIAGNOSIS — R93.89 ABNORMAL FINDING ON CT SCAN: ICD-10-CM

## 2020-03-21 DIAGNOSIS — R07.9 CHEST PAIN, UNSPECIFIED TYPE: Primary | ICD-10-CM

## 2020-03-21 LAB
ALBUMIN SERPL-MCNC: 3.9 G/DL (ref 3.4–5)
ALBUMIN/GLOB SERPL: 1.1 {RATIO} (ref 0.8–1.7)
ALP SERPL-CCNC: 75 U/L (ref 45–117)
ALT SERPL-CCNC: 17 U/L (ref 13–56)
ANION GAP SERPL CALC-SCNC: 5 MMOL/L (ref 3–18)
APPEARANCE UR: CLEAR
AST SERPL-CCNC: 14 U/L (ref 10–38)
ATRIAL RATE: 61 BPM
BACTERIA URNS QL MICRO: ABNORMAL /HPF
BASOPHILS # BLD: 0 K/UL (ref 0–0.1)
BASOPHILS NFR BLD: 0 % (ref 0–2)
BILIRUB SERPL-MCNC: 0.6 MG/DL (ref 0.2–1)
BILIRUB UR QL: NEGATIVE
BUN SERPL-MCNC: 13 MG/DL (ref 7–18)
BUN/CREAT SERPL: 17 (ref 12–20)
CALCIUM SERPL-MCNC: 9.2 MG/DL (ref 8.5–10.1)
CALCULATED P AXIS, ECG09: 31 DEGREES
CALCULATED R AXIS, ECG10: -5 DEGREES
CALCULATED T AXIS, ECG11: 29 DEGREES
CHLORIDE SERPL-SCNC: 105 MMOL/L (ref 100–111)
CK MB CFR SERPL CALC: NORMAL % (ref 0–4)
CK MB SERPL-MCNC: <1 NG/ML (ref 5–25)
CK SERPL-CCNC: 96 U/L (ref 26–192)
CO2 SERPL-SCNC: 29 MMOL/L (ref 21–32)
COLOR UR: YELLOW
CREAT SERPL-MCNC: 0.76 MG/DL (ref 0.6–1.3)
DIAGNOSIS, 93000: NORMAL
DIFFERENTIAL METHOD BLD: ABNORMAL
EOSINOPHIL # BLD: 0.2 K/UL (ref 0–0.4)
EOSINOPHIL NFR BLD: 2 % (ref 0–5)
EPITH CASTS URNS QL MICRO: ABNORMAL /LPF (ref 0–5)
ERYTHROCYTE [DISTWIDTH] IN BLOOD BY AUTOMATED COUNT: 15.1 % (ref 11.6–14.5)
GLOBULIN SER CALC-MCNC: 3.7 G/DL (ref 2–4)
GLUCOSE SERPL-MCNC: 98 MG/DL (ref 74–99)
GLUCOSE UR STRIP.AUTO-MCNC: >1000 MG/DL
HCT VFR BLD AUTO: 40.9 % (ref 35–45)
HGB BLD-MCNC: 13.1 G/DL (ref 12–16)
HGB UR QL STRIP: NEGATIVE
KETONES UR QL STRIP.AUTO: NEGATIVE MG/DL
LEUKOCYTE ESTERASE UR QL STRIP.AUTO: NEGATIVE
LIPASE SERPL-CCNC: 103 U/L (ref 73–393)
LYMPHOCYTES # BLD: 2.5 K/UL (ref 0.9–3.6)
LYMPHOCYTES NFR BLD: 35 % (ref 21–52)
MCH RBC QN AUTO: 27.1 PG (ref 24–34)
MCHC RBC AUTO-ENTMCNC: 32 G/DL (ref 31–37)
MCV RBC AUTO: 84.7 FL (ref 74–97)
MONOCYTES # BLD: 0.6 K/UL (ref 0.05–1.2)
MONOCYTES NFR BLD: 8 % (ref 3–10)
NEUTS SEG # BLD: 3.8 K/UL (ref 1.8–8)
NEUTS SEG NFR BLD: 55 % (ref 40–73)
NITRITE UR QL STRIP.AUTO: POSITIVE
P-R INTERVAL, ECG05: 154 MS
PH UR STRIP: 6.5 [PH] (ref 5–8)
PLATELET # BLD AUTO: 160 K/UL (ref 135–420)
PMV BLD AUTO: 11.8 FL (ref 9.2–11.8)
POTASSIUM SERPL-SCNC: 3.4 MMOL/L (ref 3.5–5.5)
PROT SERPL-MCNC: 7.6 G/DL (ref 6.4–8.2)
PROT UR STRIP-MCNC: NEGATIVE MG/DL
Q-T INTERVAL, ECG07: 414 MS
QRS DURATION, ECG06: 76 MS
QTC CALCULATION (BEZET), ECG08: 416 MS
RBC # BLD AUTO: 4.83 M/UL (ref 4.2–5.3)
RBC #/AREA URNS HPF: NEGATIVE /HPF (ref 0–5)
SODIUM SERPL-SCNC: 139 MMOL/L (ref 136–145)
SP GR UR REFRACTOMETRY: >1.03 (ref 1–1.03)
TROPONIN I SERPL-MCNC: <0.02 NG/ML (ref 0–0.04)
UROBILINOGEN UR QL STRIP.AUTO: 1 EU/DL (ref 0.2–1)
VENTRICULAR RATE, ECG03: 61 BPM
WBC # BLD AUTO: 7.1 K/UL (ref 4.6–13.2)
WBC URNS QL MICRO: ABNORMAL /HPF (ref 0–4)

## 2020-03-21 PROCEDURE — 83690 ASSAY OF LIPASE: CPT

## 2020-03-21 PROCEDURE — 74011250637 HC RX REV CODE- 250/637: Performed by: PHYSICIAN ASSISTANT

## 2020-03-21 PROCEDURE — 99285 EMERGENCY DEPT VISIT HI MDM: CPT

## 2020-03-21 PROCEDURE — 71045 X-RAY EXAM CHEST 1 VIEW: CPT

## 2020-03-21 PROCEDURE — 74176 CT ABD & PELVIS W/O CONTRAST: CPT

## 2020-03-21 PROCEDURE — 82550 ASSAY OF CK (CPK): CPT

## 2020-03-21 PROCEDURE — 74011000250 HC RX REV CODE- 250: Performed by: PHYSICIAN ASSISTANT

## 2020-03-21 PROCEDURE — 81001 URINALYSIS AUTO W/SCOPE: CPT

## 2020-03-21 PROCEDURE — 93005 ELECTROCARDIOGRAM TRACING: CPT

## 2020-03-21 PROCEDURE — 85025 COMPLETE CBC W/AUTO DIFF WBC: CPT

## 2020-03-21 PROCEDURE — 80053 COMPREHEN METABOLIC PANEL: CPT

## 2020-03-21 RX ORDER — HYDROCODONE BITARTRATE AND ACETAMINOPHEN 5; 325 MG/1; MG/1
1 TABLET ORAL
Status: COMPLETED | OUTPATIENT
Start: 2020-03-21 | End: 2020-03-21

## 2020-03-21 RX ORDER — ONDANSETRON 2 MG/ML
4 INJECTION INTRAMUSCULAR; INTRAVENOUS
Status: DISCONTINUED | OUTPATIENT
Start: 2020-03-21 | End: 2020-03-21 | Stop reason: HOSPADM

## 2020-03-21 RX ORDER — POTASSIUM CHLORIDE 20 MEQ/1
40 TABLET, EXTENDED RELEASE ORAL
Status: COMPLETED | OUTPATIENT
Start: 2020-03-21 | End: 2020-03-21

## 2020-03-21 RX ORDER — CEPHALEXIN 500 MG/1
500 CAPSULE ORAL 3 TIMES DAILY
Qty: 21 CAP | Refills: 0 | Status: SHIPPED | OUTPATIENT
Start: 2020-03-21 | End: 2020-03-28

## 2020-03-21 RX ORDER — ONDANSETRON 4 MG/1
4 TABLET, ORALLY DISINTEGRATING ORAL
Qty: 15 TAB | Refills: 0 | Status: SHIPPED | OUTPATIENT
Start: 2020-03-21 | End: 2020-10-22 | Stop reason: ALTCHOICE

## 2020-03-21 RX ORDER — LIDOCAINE HYDROCHLORIDE 20 MG/ML
15 SOLUTION OROPHARYNGEAL
Status: DISCONTINUED | OUTPATIENT
Start: 2020-03-21 | End: 2020-03-21 | Stop reason: HOSPADM

## 2020-03-21 RX ADMIN — HYDROCODONE BITARTRATE AND ACETAMINOPHEN 1 TABLET: 5; 325 TABLET ORAL at 09:05

## 2020-03-21 RX ADMIN — POTASSIUM CHLORIDE 40 MEQ: 1500 TABLET, EXTENDED RELEASE ORAL at 09:05

## 2020-03-21 RX ADMIN — ALUMINUM HYDROXIDE AND MAGNESIUM HYDROXIDE 15 ML: 200; 200 SUSPENSION ORAL at 07:08

## 2020-03-21 RX ADMIN — LIDOCAINE HYDROCHLORIDE 15 ML: 20 SOLUTION ORAL; TOPICAL at 07:08

## 2020-03-21 NOTE — DISCHARGE INSTRUCTIONS
Patient Education        Hypokalemia: Care Instructions  Your Care Instructions    Hypokalemia (say \"kd-ek-phg-JEFE-luis-uh\") is a low level of potassium. The heart, muscles, kidneys, and nervous system all need potassium to work well. This problem has many different causes. Kidney problems, diet, and medicines like diuretics and laxatives can cause it. So can vomiting or diarrhea. In some cases, cancer is the cause. Your doctor may do tests to find the cause of your low potassium levels. You may need medicines to bring your potassium levels back to normal. You may also need regular blood tests to check your potassium. If you have very low potassium, you may need intravenous (IV) medicines. You also may need tests to check the electrical activity of your heart. Heart problems caused by low potassium levels can be very serious. Follow-up care is a key part of your treatment and safety. Be sure to make and go to all appointments, and call your doctor if you are having problems. It's also a good idea to know your test results and keep a list of the medicines you take. How can you care for yourself at home? · If your doctor recommends it, eat foods that have a lot of potassium. These include fresh fruits, juices, and vegetables. They also include nuts, beans, and milk. · Be safe with medicines. If your doctor prescribes medicines or potassium supplements, take them exactly as directed. Call your doctor if you have any problems with your medicines. · Get your potassium levels tested as often as your doctor tells you. When should you call for help?    · You feel like your heart is missing beats.  Heart problems caused by low potassium can cause death.     · You passed out (lost consciousness).     · You have a seizure.    Call your doctor now or seek immediate medical care if:    · You feel weak or unusually tired.     · You have severe arm or leg cramps.     · You have tingling or numbness.     · You feel sick to your stomach, or you vomit.     · You have belly cramps.     · You feel bloated or constipated.     · You have to urinate a lot.     · You feel very thirsty most of the time.     · You are dizzy or lightheaded, or you feel like you may faint.     · You feel depressed, or you lose touch with reality.    Watch closely for changes in your health, and be sure to contact your doctor if:    · You do not get better as expected. Where can you learn more? Go to http://anaya-weston.info/  Enter G358 in the search box to learn more about \"Hypokalemia: Care Instructions. \"  Current as of: July 28, 2019Content Version: 12.4  © 3756-3145 [employer]wise, Incorporated. Care instructions adapted under license by [employer] (which disclaims liability or warranty for this information). If you have questions about a medical condition or this instruction, always ask your healthcare professional. [place] [address] any warranty or liability for your use of this information. Patient Education        Chest Pain: Care Instructions  Your Care Instructions    There are many things that can cause chest pain. Some are not serious and will get better on their own in a few days. But some kinds of chest pain need more testing and treatment. Your doctor may have recommended a follow-up visit in the next 8 to 12 hours. If you are not getting better, you may need more tests or treatment. Even though your doctor has released you, you still need to watch for any problems. The doctor carefully checked you, but sometimes problems can develop later. If you have new symptoms or if your symptoms do not get better, get medical care right away. If you have worse or different chest pain or pressure that lasts more than 5 minutes or you passed out (lost consciousness), call 911 or seek other emergency help right away. A medical visit is only one step in your treatment.  Even if you feel better, you still need to do what your doctor recommends, such as going to all suggested follow-up appointments and taking medicines exactly as directed. This will help you recover and help prevent future problems. How can you care for yourself at home? · Rest until you feel better. · Take your medicine exactly as prescribed. Call your doctor if you think you are having a problem with your medicine. · Do not drive after taking a prescription pain medicine. When should you call for help? Call 911 if:    · You passed out (lost consciousness).     · You have severe difficulty breathing.     · You have symptoms of a heart attack. These may include:  ? Chest pain or pressure, or a strange feeling in your chest.  ? Sweating. ? Shortness of breath. ? Nausea or vomiting. ? Pain, pressure, or a strange feeling in your back, neck, jaw, or upper belly or in one or both shoulders or arms. ? Lightheadedness or sudden weakness. ? A fast or irregular heartbeat. After you call  911, the  may tell you to chew 1 adult-strength or 2 to 4 low-dose aspirin. Wait for an ambulance. Do not try to drive yourself.    Call your doctor today if:    · You have any trouble breathing.     · Your chest pain gets worse.     · You are dizzy or lightheaded, or you feel like you may faint.     · You are not getting better as expected.     · You are having new or different chest pain. Where can you learn more? Go to http://anaya-weston.info/  Enter A120 in the search box to learn more about \"Chest Pain: Care Instructions. \"  Current as of: June 26, 2019Content Version: 12.4  © 8859-7885 [employer]wise, Incorporated. Care instructions adapted under license by [employer] (which disclaims liability or warranty for this information).  If you have questions about a medical condition or this instruction, always ask your healthcare professional. [address] any warranty or liability for your use of this information.

## 2020-03-21 NOTE — ED TRIAGE NOTES
Patient reports upper abdominal pain that radiates through her back lasting 1 week. Patient unable to sleep d/t stomach pain, mid sternal chest pain and shortness of breath. She reports taking tums last night because she has indigestion but no relief.

## 2020-03-21 NOTE — ED PROVIDER NOTES
EMERGENCY DEPARTMENT HISTORY AND PHYSICAL EXAM    Date: 3/21/2020  Patient Name: Maya Vu    History of Presenting Illness     Chief Complaint   Patient presents with    Abdominal Pain         History Provided By: Patient    Chief Complaint: Abdominal pain, chest pain, shortness of breath, indigestion, nausea, vomiting  Duration: 1 week  Timing: Intermittent  Location: Epigastric and right lower quadrant  Quality: Sharp  Severity: Moderate to severe  Modifying Factors: None  Associated Symptoms: none       Additional History (Context): Maya Vu is a 59 y.o. female with a history of hypertension, diabetes, GERD who presents today for history as listed above. Patient states all symptoms started a week ago and have been intermittent throughout the week. Reports she initially thought it was her indigestion but states it has not gone away. Patient is taking her Protonix as prescribed. Patient reports she is also had a few episodes of left-sided chest pain associated with shortness of breath throughout the week as well. Patient reports one episode of nausea and vomiting on Tuesday. Denies any fevers or chills. Reports history of cholecystectomy. PCP: MARIKA Ryan    Current Facility-Administered Medications   Medication Dose Route Frequency Provider Last Rate Last Dose    lidocaine (XYLOCAINE) 2 % viscous solution 15 mL  15 mL Mouth/Throat Q3H PRN MARIKA Hurst   15 mL at 03/21/20 0708    ondansetron (ZOFRAN) injection 4 mg  4 mg IntraVENous NOW MARIKA Hurst         Current Outpatient Medications   Medication Sig Dispense Refill    cephALEXin (Keflex) 500 mg capsule Take 1 Cap by mouth three (3) times daily for 7 days. 21 Cap 0    ondansetron (ZOFRAN ODT) 4 mg disintegrating tablet Take 1 Tab by mouth every eight (8) hours as needed for Nausea or Vomiting.  15 Tab 0    atorvastatin (LIPITOR) 20 mg tablet TAKE 1 TABLET BY MOUTH EVERY DAY 90 Tab 1    diclofenac EC (VOLTAREN) 50 mg EC tablet TAKE 1 TABLET BY MOUTH TWICE A DAY WITH MEALS 180 Tab 1    diclofenac (VOLTAREN) 1 % gel Apply 2 g to affected area four (4) times daily. 100 g 2    pantoprazole (PROTONIX) 20 mg tablet Take 20 mg by mouth daily.  KLOR-CON M10 10 mEq tablet   2    cyclobenzaprine (FLEXERIL) 10 mg tablet Take 1 Tab by mouth three (3) times daily as needed. 90 Tab 2    oxyCODONE-acetaminophen (PERCOCET) 5-325 mg per tablet   0    Azelastine (ASTEPRO) 0.15 % (205.5 mcg) nasal spray   12    traMADol (ULTRAM) 50 mg tablet every six (6) hours as needed. 0    pregabalin (LYRICA) 100 mg capsule Take 1 Cap by mouth three (3) times daily. Max Daily Amount: 300 mg. Indications: FIBROMYALGIA (Patient taking differently: Take 100 mg by mouth two (2) times a day.) 90 Cap 2    FARXIGA 5 mg tab tablet Take 5 mg by mouth daily. 5    cetirizine (ZYRTEC) 10 mg tablet Take 10 mg by mouth daily. 5    metFORMIN (GLUCOPHAGE) 1,000 mg tablet Take 1,000 mg by mouth two (2) times daily (with meals). 12    montelukast (SINGULAIR) 10 mg tablet Take 10 mg by mouth as needed. 4    tiotropium (SPIRIVA WITH HANDIHALER) 18 mcg inhalation capsule Take 1 Cap by inhalation daily.  cloNIDine (CATAPRES) 0.1 mg tablet Take 0.1 mg by mouth nightly.  losartan-hydrochlorothiazide (HYZAAR) 100-25 mg per tablet Take 1 Tab by mouth daily.  potassium chloride SA (MICRO-K) 10 mEq capsule Take 10 mEq by mouth daily.  ergocalciferol (VITAMIN D2) 50,000 unit capsule Take 50,000 Units by mouth every seven (7) days.          Past History     Past Medical History:  Past Medical History:   Diagnosis Date    Anemia     Arthritis     Asthma     Chronic pain     chronic back pain    Chronic sinusitis     Diabetes ([address]     Fibromyalgia     GERD (gastroesophageal reflux disease)     Hepatitis B 10 years ago    Hypertension     Unspecified sleep apnea     uses cpap sometimes       Past Surgical History:  Past Surgical History:   Procedure Laterality Date    HX BREAST LUMPECTOMY  2011    right breast    HX  SECTION      HX CHOLECYSTECTOMY      HX GYN      tubal ligation    HX ORTHOPAEDIC      right knee replacement    HX ORTHOPAEDIC Left     carpal tunnel       Family History:  Family History   Problem Relation Age of Onset    No Known Problems Mother     No Known Problems Father        Social History:  Social History     Tobacco Use    Smoking status: Never Smoker    Smokeless tobacco: Never Used   Substance Use Topics    Alcohol use: No    Drug use: No       Allergies: Allergies   Allergen Reactions    Latex, Natural Rubber Rash     Latex tape causes rash to area    Celebrex [Celecoxib] Hives    Iodine Hives    Sulfa (Sulfonamide Antibiotics) Hives         Review of Systems   Review of Systems   Constitutional: Negative for chills and fever. HENT: Negative for congestion, rhinorrhea and sore throat. Respiratory: Positive for shortness of breath. Negative for cough. Cardiovascular: Positive for chest pain. Gastrointestinal: Positive for abdominal pain, nausea and vomiting. Negative for blood in stool, constipation and diarrhea. Genitourinary: Negative for dysuria, frequency and hematuria. Musculoskeletal: Negative for back pain and myalgias. Skin: Negative for rash and wound. Neurological: Negative for dizziness and headaches. All other systems reviewed and are negative. All Other Systems Negative  Physical Exam     Vitals:    20 0640 20 0700 20 0715   BP: (!) 162/91 138/86 141/87   Pulse: 67 66 62   Resp: 22 15 14   Temp: 97.7 °F (36.5 °C)     SpO2: 100% 96% 96%     Physical Exam  Vitals signs and nursing note reviewed. Constitutional:       General: She is not in acute distress. Appearance: She is well-developed. She is not diaphoretic. Comments: No distress, resting in bed without any difficulties   HENT:      Head: Normocephalic and atraumatic. Eyes:      Conjunctiva/sclera: Conjunctivae normal.   Neck:      Musculoskeletal: Normal range of motion and neck supple. Cardiovascular:      Rate and Rhythm: Normal rate and regular rhythm. Pulses: No decreased pulses. Heart sounds: Normal heart sounds. Heart sounds not distant. No murmur. No systolic murmur. No diastolic murmur. Pulmonary:      Effort: Pulmonary effort is normal. No respiratory distress. Breath sounds: Normal breath sounds. No stridor, decreased air movement or transmitted upper airway sounds. No decreased breath sounds, wheezing or rhonchi. Chest:      Chest wall: No tenderness. Abdominal:      General: Bowel sounds are normal. There is no distension. Palpations: Abdomen is soft. Tenderness: There is abdominal tenderness in the right lower quadrant and epigastric area. There is rebound. There is no guarding. Comments: No guarding or peritoneal signs   Musculoskeletal:         General: No deformity. Skin:     General: Skin is warm and dry. Neurological:      Mental Status: She is alert and oriented to person, place, and time. Deep Tendon Reflexes: Reflexes are normal and symmetric. Diagnostic Study Results     Labs -     Recent Results (from the past 12 hour(s))   METABOLIC PANEL, COMPREHENSIVE    Collection Time: 03/21/20  6:53 AM   Result Value Ref Range    Sodium 139 136 - 145 mmol/L    Potassium 3.4 (L) 3.5 - 5.5 mmol/L    Chloride 105 100 - 111 mmol/L    CO2 29 21 - 32 mmol/L    Anion gap 5 3.0 - 18 mmol/L    Glucose 98 74 - 99 mg/dL    BUN 13 7.0 - 18 MG/DL    Creatinine 0.76 0.6 - 1.3 MG/DL    BUN/Creatinine ratio 17 12 - 20      GFR est AA >60 >60 ml/min/1.73m2    GFR est non-AA >60 >60 ml/min/1.73m2    Calcium 9.2 8.5 - 10.1 MG/DL    Bilirubin, total 0.6 0.2 - 1.0 MG/DL    ALT (SGPT) 17 13 - 56 U/L    AST (SGOT) 14 10 - 38 U/L    Alk.  phosphatase 75 45 - 117 U/L    Protein, total 7.6 6.4 - 8.2 g/dL    Albumin 3.9 3.4 - 5.0 g/dL Globulin 3.7 2.0 - 4.0 g/dL    A-G Ratio 1.1 0.8 - 1.7     CBC WITH AUTOMATED DIFF    Collection Time: 03/21/20  6:53 AM   Result Value Ref Range    WBC 7.1 4.6 - 13.2 K/uL    RBC 4.83 4.20 - 5.30 M/uL    HGB 13.1 12.0 - 16.0 g/dL    HCT 40.9 35.0 - 45.0 %    MCV 84.7 74.0 - 97.0 FL    MCH 27.1 24.0 - 34.0 PG    MCHC 32.0 31.0 - 37.0 g/dL    RDW 15.1 (H) 11.6 - 14.5 %    PLATELET 183 811 - 923 K/uL    MPV 11.8 9.2 - 11.8 FL    NEUTROPHILS 55 40 - 73 %    LYMPHOCYTES 35 21 - 52 %    MONOCYTES 8 3 - 10 %    EOSINOPHILS 2 0 - 5 %    BASOPHILS 0 0 - 2 %    ABS. NEUTROPHILS 3.8 1.8 - 8.0 K/UL    ABS. LYMPHOCYTES 2.5 0.9 - 3.6 K/UL    ABS. MONOCYTES 0.6 0.05 - 1.2 K/UL    ABS. EOSINOPHILS 0.2 0.0 - 0.4 K/UL    ABS.  BASOPHILS 0.0 0.0 - 0.1 K/UL    DF AUTOMATED     LIPASE    Collection Time: 03/21/20  6:53 AM   Result Value Ref Range    Lipase 103 73 - 393 U/L   CARDIAC PANEL,(CK, CKMB & TROPONIN)    Collection Time: 03/21/20  6:53 AM   Result Value Ref Range    CK 96 26 - 192 U/L    CK - MB <1.0 <3.6 ng/ml    CK-MB Index  0.0 - 4.0 %     CALCULATION NOT PERFORMED WHEN RESULT IS BELOW LINEAR LIMIT    Troponin-I, QT <0.02 0.0 - 0.045 NG/ML   EKG, 12 LEAD, INITIAL    Collection Time: 03/21/20  7:11 AM   Result Value Ref Range    Ventricular Rate 61 BPM    Atrial Rate 61 BPM    P-R Interval 154 ms    QRS Duration 76 ms    Q-T Interval 414 ms    QTC Calculation (Bezet) 416 ms    Calculated P Axis 31 degrees    Calculated R Axis -5 degrees    Calculated T Axis 29 degrees    Diagnosis       Normal sinus rhythm  Moderate voltage criteria for LVH, may be normal variant  Borderline ECG  When compared with ECG of 23-AUG-2017 13:20,  No significant change was found     URINALYSIS W/ RFLX MICROSCOPIC    Collection Time: 03/21/20  8:58 AM   Result Value Ref Range    Color YELLOW      Appearance CLEAR      Specific gravity >1.030 (H) 1.005 - 1.030    pH (UA) 6.5 5.0 - 8.0      Protein NEGATIVE  NEG mg/dL    Glucose >1,000 (A) NEG mg/dL    Ketone NEGATIVE  NEG mg/dL    Bilirubin NEGATIVE  NEG      Blood NEGATIVE  NEG      Urobilinogen 1.0 0.2 - 1.0 EU/dL    Nitrites POSITIVE (A) NEG      Leukocyte Esterase NEGATIVE  NEG     URINE MICROSCOPIC ONLY    Collection Time: 03/21/20  8:58 AM   Result Value Ref Range    WBC 2 to 4 0 - 4 /hpf    RBC NEGATIVE  0 - 5 /hpf    Epithelial cells FEW 0 - 5 /lpf    Bacteria 1+ (A) NEG /hpf       Radiologic Studies -   CT ABD PELV WO CONT   Final Result   IMPRESSION:      Moderate colonic stool burden without other acute findings to explain patient's   symptoms. Similar dystrophic calcification left kidney with associated scarring. Unchanged 4 mm right lower lobe pulmonary nodule compatible with benign   etiology. No follow-up recommended. XR CHEST PORT   Final Result   IMPRESSION:      No acute findings. CT Results  (Last 48 hours)               03/21/20 0743  CT ABD PELV WO CONT Final result    Impression:  IMPRESSION:       Moderate colonic stool burden without other acute findings to explain patient's   symptoms. Similar dystrophic calcification left kidney with associated scarring. Unchanged 4 mm right lower lobe pulmonary nodule compatible with benign   etiology. No follow-up recommended. Narrative:  CT ABDOMEN AND PELVIS WITHOUT ENHANCEMENT       INDICATION: Abdominal discomfort for a week which has not resolved, left-sided   chest pain, shortness of breath, nausea, vomiting. TECHNIQUE: Axial images obtained of the abdomen and pelvis without intravenous   contrast. Coronal and sagittal reformatted images of the abdomen and pelvis were   obtained.        All CT scans at this facility are performed using dose optimization technique as   appropriate to a performed exam, to include automated exposure control,   adjustment of the mA and/or kV according to patient size (including appropriate   matching first site-specific examinations), or use of iterative reconstruction technique. COMPARISON: 3/21/2014. Lack of intravenous contrast renders this study suboptimal for evaluating the   solid abdominal organs, vasculature and bowel. ABDOMEN FINDINGS:        Liver: Unremarkable. Spleen: Unremarkable. Pancreas: Unremarkable. Biliary: Cholecystectomy. Bowel: Moderate colonic stool burden. Normal appendix. Bowel is normal in   caliber and without wall inflammation. No pneumatosis. Peritoneum/ Retroperitoneum: Unremarkable. Lymph Nodes: Unremarkable. Adrenal Glands: Unremarkable. Kidneys: Similar dystrophic calcification left interpolar kidney with scarring. Vessels: Unremarkable for age. PELVIS FINDINGS:        Bladder/ Pelvic Organs: Bladder is normal. Uterus is anteverted. Bilateral tubal   ligation clips. Lung Base: There is groundglass at the lung bases compatible with hypoinflation. Similar 4 mm density anterior right lower lobe (6). Bones/Soft tissues: Lumbar facet hypertrophy and arthropathy. SI joint   degenerative changes with anterior spurring on the left. Bilateral ischial   tuberosity enthesophytes. Degenerative changes bilateral hips and thoracolumbar   spine. CXR Results  (Last 48 hours)               03/21/20 0659  XR CHEST PORT Final result    Impression:  IMPRESSION:       No acute findings. Narrative:  PORTABLE CHEST RADIOGRAPH        CPT CODE: 44492        INDICATION: Upper abdominal pain radiating to back for one week, midsternal   chest pain, shortness of breath. COMPARISON: 8/23/2017. FINDINGS:       Frontal view of the chest obtained at 0 648 hours. Lungs are hypoinflated. The   cardiomediastinal silhouette is within normal limits. The pulmonary vascular   markings are unremarkable. There is no evidence of focal consolidation, pleural   effusion or pneumothorax. Degenerative changes bilateral acromioclavicular   joints.                        Medical Decision Making   I am the first provider for this patient. I reviewed the vital signs, available nursing notes, past medical history, past surgical history, family history and social history. Vital Signs-Reviewed the patient's vital signs. Records Reviewed: Nursing Notes and Old Medical Records     Procedures: None   Procedures    Provider Notes (Medical Decision Making):     Differential Diagnosis: hepatitis, pancreatitis, PUD, gastritis, gastroenteritis, hiatal hernia, constipation, SBO, LBO, IBS, IBD, PNA, AMI, PE, splenic abscess/infarction, malignancy, ACS, UTI     Plan: Will order cardiac workup given location of pain. Will order ct of abd and pelvis given abd exam findings. Will order GI cocktail and zofran. 9:00 AM  Have discussed overall reassuring work-up with patient thus far. Did discuss multiple incidental CT findings. Have stressed the importance of follow-up. Patient reports chest pain has resolved but she still has some mild epigastric tenderness. Will order Norco.  Pending urine at this time. 9:24 AM  I have discussed positive nitrates and urine with patient. Will discharge home with antibiotics and Zofran. Have also discussed glucose found in urine, although glucose at this time is normal I have discussed the importance of follow-up with PCP. Return precautions have been given. Patient states she is feeling much better at this time and is ready to go home. Patient agrees with the plan and management and states all questions have been thoroughly answered and there are no more remaining questions.        MED RECONCILIATION:  Current Facility-Administered Medications   Medication Dose Route Frequency    lidocaine (XYLOCAINE) 2 % viscous solution 15 mL  15 mL Mouth/Throat Q3H PRN    ondansetron (ZOFRAN) injection 4 mg  4 mg IntraVENous NOW     Current Outpatient Medications   Medication Sig    cephALEXin (Keflex) 500 mg capsule Take 1 Cap by mouth three (3) times daily for 7 days.    ondansetron (ZOFRAN ODT) 4 mg disintegrating tablet Take 1 Tab by mouth every eight (8) hours as needed for Nausea or Vomiting.  atorvastatin (LIPITOR) 20 mg tablet TAKE 1 TABLET BY MOUTH EVERY DAY    diclofenac EC (VOLTAREN) 50 mg EC tablet TAKE 1 TABLET BY MOUTH TWICE A DAY WITH MEALS    diclofenac (VOLTAREN) 1 % gel Apply 2 g to affected area four (4) times daily.  pantoprazole (PROTONIX) 20 mg tablet Take 20 mg by mouth daily.  KLOR-CON M10 10 mEq tablet     cyclobenzaprine (FLEXERIL) 10 mg tablet Take 1 Tab by mouth three (3) times daily as needed.  oxyCODONE-acetaminophen (PERCOCET) 5-325 mg per tablet     Azelastine (ASTEPRO) 0.15 % (205.5 mcg) nasal spray     traMADol (ULTRAM) 50 mg tablet every six (6) hours as needed.  pregabalin (LYRICA) 100 mg capsule Take 1 Cap by mouth three (3) times daily. Max Daily Amount: 300 mg. Indications: FIBROMYALGIA (Patient taking differently: Take 100 mg by mouth two (2) times a day.)    FARXIGA 5 mg tab tablet Take 5 mg by mouth daily.  cetirizine (ZYRTEC) 10 mg tablet Take 10 mg by mouth daily.  metFORMIN (GLUCOPHAGE) 1,000 mg tablet Take 1,000 mg by mouth two (2) times daily (with meals).  montelukast (SINGULAIR) 10 mg tablet Take 10 mg by mouth as needed.  tiotropium (SPIRIVA WITH HANDIHALER) 18 mcg inhalation capsule Take 1 Cap by inhalation daily.  cloNIDine (CATAPRES) 0.1 mg tablet Take 0.1 mg by mouth nightly.  losartan-hydrochlorothiazide (HYZAAR) 100-25 mg per tablet Take 1 Tab by mouth daily.  potassium chloride SA (MICRO-K) 10 mEq capsule Take 10 mEq by mouth daily.  ergocalciferol (VITAMIN D2) 50,000 unit capsule Take 50,000 Units by mouth every seven (7) days. Disposition:  Home     DISCHARGE NOTE:   Pt has been reexamined. Patient has no new complaints, changes, or physical findings. Care plan outlined and precautions discussed. Results of workup were reviewed with the patient.  All medications were reviewed with the patient. All of pt's questions and concerns were addressed. Patient was instructed and agrees to follow up with PCP as well as to return to the ED upon further deterioration. Patient is ready to go home. Follow-up Information     Follow up With Specialties Details Why Contact Info    [facility] EMERGENCY DEPT Emergency Medicine  As needed [address]  [phone]    MARIKA Rai Physician Assistant Schedule an appointment as soon as possible for a visit  [address] [place] [address]  [address] [place] [address] [facility]            Current Discharge Medication List      START taking these medications    Details   cephALEXin (Keflex) 500 mg capsule Take 1 Cap by mouth three (3) times daily for 7 days. Qty: 21 Cap, Refills: 0      ondansetron (ZOFRAN ODT) 4 mg disintegrating tablet Take 1 Tab by mouth every eight (8) hours as needed for Nausea or Vomiting. Qty: 15 Tab, Refills: 0                 Diagnosis     Clinical Impression:   1. Chest pain, unspecified type    2. Abdominal pain, unspecified abdominal location    3. Hypokalemia    4. Abnormal finding on CT scan          \"Please note that this dictation was completed with [employer], the computer voice recognition software. Quite often unanticipated grammatical, syntax, homophones, and other interpretive errors are inadvertently transcribed by the computer software. Please disregard these errors. Please excuse any errors that have escaped final proofreading. \"

## 2020-03-22 ENCOUNTER — HOSPITAL ENCOUNTER (EMERGENCY)
Age: 65
Discharge: HOME | End: 2020-03-22
Attending: EMERGENCY MEDICINE | Admitting: EMERGENCY MEDICINE
Payer: MEDICARE

## 2020-03-22 VITALS
HEART RATE: 90 BPM | HEIGHT: 65 IN | DIASTOLIC BLOOD PRESSURE: 88 MMHG | RESPIRATION RATE: 16 BRPM | OXYGEN SATURATION: 97 % | WEIGHT: 195 LBS | TEMPERATURE: 98.5 F | SYSTOLIC BLOOD PRESSURE: 155 MMHG | BODY MASS INDEX: 32.49 KG/M2

## 2020-03-22 DIAGNOSIS — R11.2 NON-INTRACTABLE VOMITING WITH NAUSEA, UNSPECIFIED VOMITING TYPE: Primary | ICD-10-CM

## 2020-03-22 DIAGNOSIS — K59.00 CONSTIPATION, UNSPECIFIED CONSTIPATION TYPE: ICD-10-CM

## 2020-03-22 LAB
ALBUMIN SERPL-MCNC: 4.4 G/DL (ref 3.4–5)
ALBUMIN/GLOB SERPL: 1 {RATIO} (ref 0.8–1.7)
ALP SERPL-CCNC: 92 U/L (ref 45–117)
ALT SERPL-CCNC: 17 U/L (ref 13–56)
ANION GAP SERPL CALC-SCNC: 5 MMOL/L (ref 3–18)
APPEARANCE UR: CLEAR
AST SERPL-CCNC: 15 U/L (ref 10–38)
BACTERIA URNS QL MICRO: ABNORMAL /HPF
BASOPHILS # BLD: 0 K/UL (ref 0–0.1)
BASOPHILS NFR BLD: 0 % (ref 0–2)
BILIRUB SERPL-MCNC: 0.3 MG/DL (ref 0.2–1)
BILIRUB UR QL: NEGATIVE
BUN SERPL-MCNC: 8 MG/DL (ref 7–18)
BUN/CREAT SERPL: 11 (ref 12–20)
CALCIUM SERPL-MCNC: 10.5 MG/DL (ref 8.5–10.1)
CHLORIDE SERPL-SCNC: 101 MMOL/L (ref 100–111)
CO2 SERPL-SCNC: 30 MMOL/L (ref 21–32)
COLOR UR: YELLOW
CREAT SERPL-MCNC: 0.75 MG/DL (ref 0.6–1.3)
DIFFERENTIAL METHOD BLD: ABNORMAL
EOSINOPHIL # BLD: 0.1 K/UL (ref 0–0.4)
EOSINOPHIL NFR BLD: 1 % (ref 0–5)
EPITH CASTS URNS QL MICRO: ABNORMAL /LPF (ref 0–5)
ERYTHROCYTE [DISTWIDTH] IN BLOOD BY AUTOMATED COUNT: 14.9 % (ref 11.6–14.5)
GLOBULIN SER CALC-MCNC: 4.4 G/DL (ref 2–4)
GLUCOSE SERPL-MCNC: 137 MG/DL (ref 74–99)
GLUCOSE UR STRIP.AUTO-MCNC: >1000 MG/DL
HCT VFR BLD AUTO: 44.7 % (ref 35–45)
HGB BLD-MCNC: 14.8 G/DL (ref 12–16)
HGB UR QL STRIP: NEGATIVE
KETONES UR QL STRIP.AUTO: NEGATIVE MG/DL
LEUKOCYTE ESTERASE UR QL STRIP.AUTO: NEGATIVE
LIPASE SERPL-CCNC: 246 U/L (ref 73–393)
LYMPHOCYTES # BLD: 2.4 K/UL (ref 0.9–3.6)
LYMPHOCYTES NFR BLD: 25 % (ref 21–52)
MCH RBC QN AUTO: 28 PG (ref 24–34)
MCHC RBC AUTO-ENTMCNC: 33.1 G/DL (ref 31–37)
MCV RBC AUTO: 84.5 FL (ref 74–97)
MONOCYTES # BLD: 0.6 K/UL (ref 0.05–1.2)
MONOCYTES NFR BLD: 7 % (ref 3–10)
NEUTS SEG # BLD: 6.6 K/UL (ref 1.8–8)
NEUTS SEG NFR BLD: 67 % (ref 40–73)
NITRITE UR QL STRIP.AUTO: NEGATIVE
PH UR STRIP: 6.5 [PH] (ref 5–8)
PLATELET # BLD AUTO: 174 K/UL (ref 135–420)
PMV BLD AUTO: 12.2 FL (ref 9.2–11.8)
POTASSIUM SERPL-SCNC: 3.6 MMOL/L (ref 3.5–5.5)
PROT SERPL-MCNC: 8.8 G/DL (ref 6.4–8.2)
PROT UR STRIP-MCNC: 300 MG/DL
RBC # BLD AUTO: 5.29 M/UL (ref 4.2–5.3)
RBC #/AREA URNS HPF: ABNORMAL /HPF (ref 0–5)
SODIUM SERPL-SCNC: 136 MMOL/L (ref 136–145)
SP GR UR REFRACTOMETRY: 1.01 (ref 1–1.03)
UROBILINOGEN UR QL STRIP.AUTO: 0.2 EU/DL (ref 0.2–1)
WBC # BLD AUTO: 9.6 K/UL (ref 4.6–13.2)
WBC URNS QL MICRO: ABNORMAL /HPF (ref 0–5)

## 2020-03-22 PROCEDURE — 83690 ASSAY OF LIPASE: CPT

## 2020-03-22 PROCEDURE — 99283 EMERGENCY DEPT VISIT LOW MDM: CPT

## 2020-03-22 PROCEDURE — 87086 URINE CULTURE/COLONY COUNT: CPT

## 2020-03-22 PROCEDURE — 96365 THER/PROPH/DIAG IV INF INIT: CPT

## 2020-03-22 PROCEDURE — 85025 COMPLETE CBC W/AUTO DIFF WBC: CPT

## 2020-03-22 PROCEDURE — 96361 HYDRATE IV INFUSION ADD-ON: CPT

## 2020-03-22 PROCEDURE — 74011000250 HC RX REV CODE- 250: Performed by: EMERGENCY MEDICINE

## 2020-03-22 PROCEDURE — 74011250636 HC RX REV CODE- 250/636: Performed by: EMERGENCY MEDICINE

## 2020-03-22 PROCEDURE — C9113 INJ PANTOPRAZOLE SODIUM, VIA: HCPCS | Performed by: EMERGENCY MEDICINE

## 2020-03-22 PROCEDURE — 80053 COMPREHEN METABOLIC PANEL: CPT

## 2020-03-22 PROCEDURE — 74011250636 HC RX REV CODE- 250/636: Performed by: PHYSICIAN ASSISTANT

## 2020-03-22 PROCEDURE — 74011000258 HC RX REV CODE- 258: Performed by: PHYSICIAN ASSISTANT

## 2020-03-22 PROCEDURE — 81001 URINALYSIS AUTO W/SCOPE: CPT

## 2020-03-22 PROCEDURE — 96375 TX/PRO/DX INJ NEW DRUG ADDON: CPT

## 2020-03-22 RX ORDER — PANTOPRAZOLE SODIUM 40 MG/10ML
40 INJECTION, POWDER, LYOPHILIZED, FOR SOLUTION INTRAVENOUS
Status: DISCONTINUED | OUTPATIENT
Start: 2020-03-22 | End: 2020-03-22

## 2020-03-22 RX ORDER — PROMETHAZINE HYDROCHLORIDE 25 MG/1
12.5-25 SUPPOSITORY RECTAL
Qty: 10 SUPPOSITORY | Refills: 0 | Status: SHIPPED | OUTPATIENT
Start: 2020-03-22 | End: 2020-03-29

## 2020-03-22 RX ADMIN — SODIUM CHLORIDE 40 MG: 9 INJECTION, SOLUTION INTRAMUSCULAR; INTRAVENOUS; SUBCUTANEOUS at 18:27

## 2020-03-22 RX ADMIN — PROMETHAZINE HYDROCHLORIDE 12.5 MG: 25 INJECTION INTRAMUSCULAR; INTRAVENOUS at 18:30

## 2020-03-22 RX ADMIN — SODIUM CHLORIDE 1000 ML: 900 INJECTION, SOLUTION INTRAVENOUS at 18:28

## 2020-03-22 NOTE — ED NOTES
C/o pain in chest and stomach. Here yesterday, found low K, HTN, UTI, and nodule on her lung. Reports N/V all night and day today. Went home but states she isn't any better. Been taking prescribed antibiotic and zofran.

## 2020-03-22 NOTE — ED NOTES
Discharge instructions reviewed with patient. Patient verbalized understanding. Left ambulatory with belongings and in NAD.

## 2020-03-22 NOTE — DISCHARGE INSTRUCTIONS
Patient Education        Constipation: Care Instructions  Your Care Instructions    Constipation means that you have a hard time passing stools (bowel movements). People pass stools from 3 times a day to once every 3 days. What is normal for you may be different. Constipation may occur with pain in the rectum and cramping. The pain may get worse when you try to pass stools. Sometimes there are small amounts of bright red blood on toilet paper or the surface of stools. This is because of enlarged veins near the rectum (hemorrhoids). A few changes in your diet and lifestyle may help you avoid ongoing constipation. Your doctor may also prescribe medicine to help loosen your stool. Some medicines can cause constipation. These include pain medicines and antidepressants. Tell your doctor about all the medicines you take. Your doctor may want to make a medicine change to ease your symptoms. Follow-up care is a key part of your treatment and safety. Be sure to make and go to all appointments, and call your doctor if you are having problems. It's also a good idea to know your test results and keep a list of the medicines you take. How can you care for yourself at home? · Drink plenty of fluids, enough so that your urine is light yellow or clear like water. If you have kidney, heart, or liver disease and have to limit fluids, talk with your doctor before you increase the amount of fluids you drink. · Include high-fiber foods in your diet each day. These include fruits, vegetables, beans, and whole grains. · Get at least 30 minutes of exercise on most days of the week. Walking is a good choice. You also may want to do other activities, such as running, swimming, cycling, or playing tennis or team sports. · Take a fiber supplement, such as Citrucel or Metamucil, every day. Read and follow all instructions on the label. · Schedule time each day for a bowel movement. A daily routine may help.  Take your time having your bowel movement. · Support your feet with a small step stool when you sit on the toilet. This helps flex your hips and places your pelvis in a squatting position. · Your doctor may recommend an over-the-counter laxative to relieve your constipation. Examples are Milk of Magnesia and MiraLax. Read and follow all instructions on the label. Do not use laxatives on a long-term basis. When should you call for help? Call your doctor now or seek immediate medical care if:    · You have new or worse belly pain.     · You have new or worse nausea or vomiting.     · You have blood in your stools.    Watch closely for changes in your health, and be sure to contact your doctor if:    · Your constipation is getting worse.     · You do not get better as expected. Where can you learn more? Go to http://anaya-weston.info/  Enter P343 in the search box to learn more about \"Constipation: Care Instructions. \"  Current as of: June 26, 2019Content Version: 12.4  © 7602-3527 [employer]. Care instructions adapted under license by [employer] (which disclaims liability or warranty for this information). If you have questions about a medical condition or this instruction, always ask your healthcare professional. [address] any warranty or liability for your use of this information.

## 2020-03-23 LAB
BACTERIA SPEC CULT: NORMAL
SERVICE CMNT-IMP: NORMAL

## 2020-03-23 NOTE — ED PROVIDER NOTES
EMERGENCY DEPARTMENT HISTORY AND PHYSICAL EXAM    Date: 3/22/2020  Patient Name: Emily Najera    History of Presenting Illness     No chief complaint on file. History Provided By: Patient and The patient's medical record        Additional History (Context): Emily Najera is a 59 y.o. female with diabetes, hypertension and hyperlipidemia who presents with recent history of visit to the ER yesterday for chest pain and epigastric abdominal pain with nausea and vomiting. Patient returns to the emergency department today with to new nausea and vomiting despite using Zofran every 8 hours for the symptoms. Patient denies chest pain at this time. She was also diagnosed with UTI and treated with Keflex. Patient denies urinary symptoms at time of visit yesterday this was found as an incidental finding. Patient continues to deny fever, myalgias or chills. Patient has inability to tolerate p.o. intake. She denies recent travel out of the country. Patient also reports surgical history of cholecystectomy. PCP: MARIKA Durán    Current Outpatient Medications   Medication Sig Dispense Refill    promethazine (PHENERGAN) 25 mg suppository Insert 0.5-1 Suppositories into rectum every six (6) hours as needed for Nausea (or vomiting) for up to 7 days. 10 Suppository 0    cephALEXin (Keflex) 500 mg capsule Take 1 Cap by mouth three (3) times daily for 7 days. 21 Cap 0    ondansetron (ZOFRAN ODT) 4 mg disintegrating tablet Take 1 Tab by mouth every eight (8) hours as needed for Nausea or Vomiting. 15 Tab 0    atorvastatin (LIPITOR) 20 mg tablet TAKE 1 TABLET BY MOUTH EVERY DAY 90 Tab 1    diclofenac EC (VOLTAREN) 50 mg EC tablet TAKE 1 TABLET BY MOUTH TWICE A DAY WITH MEALS 180 Tab 1    diclofenac (VOLTAREN) 1 % gel Apply 2 g to affected area four (4) times daily. 100 g 2    pantoprazole (PROTONIX) 20 mg tablet Take 20 mg by mouth daily.       KLOR-CON M10 10 mEq tablet   2    cyclobenzaprine (FLEXERIL) 10 mg tablet Take 1 Tab by mouth three (3) times daily as needed. 90 Tab 2    oxyCODONE-acetaminophen (PERCOCET) 5-325 mg per tablet   0    Azelastine (ASTEPRO) 0.15 % (205.5 mcg) nasal spray   12    traMADol (ULTRAM) 50 mg tablet every six (6) hours as needed. 0    pregabalin (LYRICA) 100 mg capsule Take 1 Cap by mouth three (3) times daily. Max Daily Amount: 300 mg. Indications: FIBROMYALGIA (Patient taking differently: Take 100 mg by mouth two (2) times a day.) 90 Cap 2    FARXIGA 5 mg tab tablet Take 5 mg by mouth daily. 5    cetirizine (ZYRTEC) 10 mg tablet Take 10 mg by mouth daily. 5    metFORMIN (GLUCOPHAGE) 1,000 mg tablet Take 1,000 mg by mouth two (2) times daily (with meals). 12    montelukast (SINGULAIR) 10 mg tablet Take 10 mg by mouth as needed. 4    tiotropium (SPIRIVA WITH HANDIHALER) 18 mcg inhalation capsule Take 1 Cap by inhalation daily.  cloNIDine (CATAPRES) 0.1 mg tablet Take 0.1 mg by mouth nightly.  losartan-hydrochlorothiazide (HYZAAR) 100-25 mg per tablet Take 1 Tab by mouth daily.  potassium chloride SA (MICRO-K) 10 mEq capsule Take 10 mEq by mouth daily.  ergocalciferol (VITAMIN D2) 50,000 unit capsule Take 50,000 Units by mouth every seven (7) days.          Past History     Past Medical History:  Past Medical History:   Diagnosis Date    Anemia     Arthritis     Asthma     Chronic pain     chronic back pain    Chronic sinusitis     Diabetes ([facility] [address]     Fibromyalgia     GERD (gastroesophageal reflux disease)     Hepatitis B 10 years ago    Hypertension     Unspecified sleep apnea     uses cpap sometimes       Past Surgical History:  Past Surgical History:   Procedure Laterality Date    HX BREAST LUMPECTOMY  2011    right breast    HX  SECTION      HX CHOLECYSTECTOMY      HX GYN      tubal ligation    HX ORTHOPAEDIC      right knee replacement    HX ORTHOPAEDIC Left     carpal tunnel       Family History:  Family History   Problem Relation Age of Onset    No Known Problems Mother     No Known Problems Father        Social History:  Social History     Tobacco Use    Smoking status: Never Smoker    Smokeless tobacco: Never Used   Substance Use Topics    Alcohol use: No    Drug use: No       Allergies: Allergies   Allergen Reactions    Latex, Natural Rubber Rash     Latex tape causes rash to area    Celebrex [Celecoxib] Hives    Iodine Hives    Sulfa (Sulfonamide Antibiotics) Hives         Review of Systems   Review of Systems  Review of Systems   Constitutional: Negative for fatigue and fever. HENT: Negative for congestion. Respiratory: Negative for cough and shortness of breath. Cardiovascular: Negative for chest pain. Gastrointestinal: Positive for diffuse very mild generalized abdominal pain without diarrhea. Pt has breakthrough nausea and vomiting despite zofran. .   Genitourinary: Negative for difficulty urinating and dysuria. Musculoskeletal: Negative joint pain, joint swelling, recent injury. Skin: Negative for wound. Neurological: Negative for dizziness and headaches. All other systems reviewed and are negative. All Other Systems Negative  Physical Exam     Vitals:    03/22/20 1457 03/22/20 1900 03/22/20 1914   BP: 117/82 155/88    Pulse: 79 86 90   Resp: 16     Temp: 98.5 °F (36.9 °C)     SpO2: 99%  97%   Weight: 88.5 kg (195 lb)     Height: 5' 5\" (1.651 m)       Physical Exam     Constitutional: Pt is oriented to person, place, and time. Pt appears well-developed and well-nourished. Pt in mild distress. HENT:   Head: Normocephalic and atraumatic. Mouth/Throat: Oropharynx is clear and moist.   Eyes: Pupils are equal, round, and reactive to light. Neck: Normal range of motion. Neck supple. No tracheal deviation present. No thyromegaly present. Cardiovascular: Normal rate, regular rhythm and normal heart sounds. No murmur heard.   Pulmonary/Chest: Effort normal and breath sounds normal. No respiratory distress. No wheezes or rales. Abdominal: Soft. no distension and no mass. There is no tenderness. There is no rebound and no guarding. No CVA tenderness. Musculoskeletal: Normal range of motion. No edema or deformity. Neurological: Pt is alert and oriented to person, place, and time   Skin: Skin is warm and dry. Psychiatric: Pt has a normal mood and affect;  behavior is normal. Judgment and thought content normal.           Diagnostic Study Results     Labs -     Recent Results (from the past 12 hour(s))   URINALYSIS W/ RFLX MICROSCOPIC    Collection Time: 03/22/20  4:19 PM   Result Value Ref Range    Color YELLOW      Appearance CLEAR      Specific gravity 1.014 1.005 - 1.030      pH (UA) 6.5 5.0 - 8.0      Protein 300 (A) NEG mg/dL    Glucose >1,000 (A) NEG mg/dL    Ketone NEGATIVE  NEG mg/dL    Bilirubin NEGATIVE  NEG      Blood NEGATIVE  NEG      Urobilinogen 0.2 0.2 - 1.0 EU/dL    Nitrites NEGATIVE  NEG      Leukocyte Esterase NEGATIVE  NEG     URINE MICROSCOPIC ONLY    Collection Time: 03/22/20  4:19 PM   Result Value Ref Range    WBC 4 to 10 0 - 5 /hpf    RBC 0 to 3 0 - 5 /hpf    Epithelial cells 2+ 0 - 5 /lpf    Bacteria 1+ (A) NEG /hpf   CBC WITH AUTOMATED DIFF    Collection Time: 03/22/20  4:37 PM   Result Value Ref Range    WBC 9.6 4.6 - 13.2 K/uL    RBC 5.29 4. 20 - 5.30 M/uL    HGB 14.8 12.0 - 16.0 g/dL    HCT 44.7 35.0 - 45.0 %    MCV 84.5 74.0 - 97.0 FL    MCH 28.0 24.0 - 34.0 PG    MCHC 33.1 31.0 - 37.0 g/dL    RDW 14.9 (H) 11.6 - 14.5 %    PLATELET 985 302 - 924 K/uL    MPV 12.2 (H) 9.2 - 11.8 FL    NEUTROPHILS 67 40 - 73 %    LYMPHOCYTES 25 21 - 52 %    MONOCYTES 7 3 - 10 %    EOSINOPHILS 1 0 - 5 %    BASOPHILS 0 0 - 2 %    ABS. NEUTROPHILS 6.6 1.8 - 8.0 K/UL    ABS. LYMPHOCYTES 2.4 0.9 - 3.6 K/UL    ABS. MONOCYTES 0.6 0.05 - 1.2 K/UL    ABS. EOSINOPHILS 0.1 0.0 - 0.4 K/UL    ABS.  BASOPHILS 0.0 0.0 - 0.1 K/UL    DF AUTOMATED     METABOLIC PANEL, COMPREHENSIVE    Collection Time: 03/22/20  4:37 PM   Result Value Ref Range    Sodium 136 136 - 145 mmol/L    Potassium 3.6 3.5 - 5.5 mmol/L    Chloride 101 100 - 111 mmol/L    CO2 30 21 - 32 mmol/L    Anion gap 5 3.0 - 18 mmol/L    Glucose 137 (H) 74 - 99 mg/dL    BUN 8 7.0 - 18 MG/DL    Creatinine 0.75 0.6 - 1.3 MG/DL    BUN/Creatinine ratio 11 (L) 12 - 20      GFR est AA >60 >60 ml/min/1.73m2    GFR est non-AA >60 >60 ml/min/1.73m2    Calcium 10.5 (H) 8.5 - 10.1 MG/DL    Bilirubin, total 0.3 0.2 - 1.0 MG/DL    ALT (SGPT) 17 13 - 56 U/L    AST (SGOT) 15 10 - 38 U/L    Alk. phosphatase 92 45 - 117 U/L    Protein, total 8.8 (H) 6.4 - 8.2 g/dL    Albumin 4.4 3.4 - 5.0 g/dL    Globulin 4.4 (H) 2.0 - 4.0 g/dL    A-G Ratio 1.0 0.8 - 1.7     LIPASE    Collection Time: 03/22/20  4:37 PM   Result Value Ref Range    Lipase 246 73 - 393 U/L       Radiologic Studies -   No orders to display     CT Results  (Last 48 hours)               03/21/20 0743  CT ABD PELV WO CONT Final result    Impression:  IMPRESSION:       Moderate colonic stool burden without other acute findings to explain patient's   symptoms. Similar dystrophic calcification left kidney with associated scarring. Unchanged 4 mm right lower lobe pulmonary nodule compatible with benign   etiology. No follow-up recommended. Narrative:  CT ABDOMEN AND PELVIS WITHOUT ENHANCEMENT       INDICATION: Abdominal discomfort for a week which has not resolved, left-sided   chest pain, shortness of breath, nausea, vomiting. TECHNIQUE: Axial images obtained of the abdomen and pelvis without intravenous   contrast. Coronal and sagittal reformatted images of the abdomen and pelvis were   obtained.        All CT scans at this facility are performed using dose optimization technique as   appropriate to a performed exam, to include automated exposure control,   adjustment of the mA and/or kV according to patient size (including appropriate   matching first site-specific examinations), or use of iterative reconstruction   technique. COMPARISON: 3/21/2014. Lack of intravenous contrast renders this study suboptimal for evaluating the   solid abdominal organs, vasculature and bowel. ABDOMEN FINDINGS:        Liver: Unremarkable. Spleen: Unremarkable. Pancreas: Unremarkable. Biliary: Cholecystectomy. Bowel: Moderate colonic stool burden. Normal appendix. Bowel is normal in   caliber and without wall inflammation. No pneumatosis. Peritoneum/ Retroperitoneum: Unremarkable. Lymph Nodes: Unremarkable. Adrenal Glands: Unremarkable. Kidneys: Similar dystrophic calcification left interpolar kidney with scarring. Vessels: Unremarkable for age. PELVIS FINDINGS:        Bladder/ Pelvic Organs: Bladder is normal. Uterus is anteverted. Bilateral tubal   ligation clips. Lung Base: There is groundglass at the lung bases compatible with hypoinflation. Similar 4 mm density anterior right lower lobe (6). Bones/Soft tissues: Lumbar facet hypertrophy and arthropathy. SI joint   degenerative changes with anterior spurring on the left. Bilateral ischial   tuberosity enthesophytes. Degenerative changes bilateral hips and thoracolumbar   spine. CXR Results  (Last 48 hours)               03/21/20 0659  XR CHEST PORT Final result    Impression:  IMPRESSION:       No acute findings. Narrative:  PORTABLE CHEST RADIOGRAPH        CPT CODE: 37903        INDICATION: Upper abdominal pain radiating to back for one week, midsternal   chest pain, shortness of breath. COMPARISON: 8/23/2017. FINDINGS:       Frontal view of the chest obtained at 0 648 hours. Lungs are hypoinflated. The   cardiomediastinal silhouette is within normal limits. The pulmonary vascular   markings are unremarkable. There is no evidence of focal consolidation, pleural   effusion or pneumothorax. Degenerative changes bilateral acromioclavicular   joints. Medical Decision Making   I am the first provider for this patient. I reviewed the vital signs, available nursing notes, past medical history, past surgical history, family history and social history. Vital Signs-Reviewed the patient's vital signs. Comparison:    Records Reviewed: Nursing Notes, Old Medical Records, Previous Radiology Studies and Previous Laboratory Studies    Procedures:  Procedures    Provider Notes (Medical Decision Making):   Patient has breakthrough nausea and vomiting despite Zofran every 8 hours. Review of the patient's lab results and imaging from yesterday her CBC remains within normal limits and comparable to yesterday. Her urine analysis results are improved today indicating the Keflex is improving her UTI. In review of her CT scan from yesterday moderate colonic stool presence no other findings consistent with her symptoms. One liter of fluid, Protonix, IV Phenergan were ordered. Patient reports improvement in her nausea status post Phenergan. And her labs are either improved or equal to yesterday's labs I feel she is appropriate for discharge. I will be discharging her with Phenergan suppository to be used for through nausea and vomiting, however, I did advise her to start with the Zofran and a bland diet. Follow-up with primary care early this week. MED RECONCILIATION:  No current facility-administered medications for this encounter. Current Outpatient Medications   Medication Sig    promethazine (PHENERGAN) 25 mg suppository Insert 0.5-1 Suppositories into rectum every six (6) hours as needed for Nausea (or vomiting) for up to 7 days.  cephALEXin (Keflex) 500 mg capsule Take 1 Cap by mouth three (3) times daily for 7 days.  ondansetron (ZOFRAN ODT) 4 mg disintegrating tablet Take 1 Tab by mouth every eight (8) hours as needed for Nausea or Vomiting.     atorvastatin (LIPITOR) 20 mg tablet TAKE 1 TABLET BY MOUTH EVERY DAY    diclofenac EC (VOLTAREN) 50 mg EC tablet TAKE 1 TABLET BY MOUTH TWICE A DAY WITH MEALS    diclofenac (VOLTAREN) 1 % gel Apply 2 g to affected area four (4) times daily.  pantoprazole (PROTONIX) 20 mg tablet Take 20 mg by mouth daily.  KLOR-CON M10 10 mEq tablet     cyclobenzaprine (FLEXERIL) 10 mg tablet Take 1 Tab by mouth three (3) times daily as needed.  oxyCODONE-acetaminophen (PERCOCET) 5-325 mg per tablet     Azelastine (ASTEPRO) 0.15 % (205.5 mcg) nasal spray     traMADol (ULTRAM) 50 mg tablet every six (6) hours as needed.  pregabalin (LYRICA) 100 mg capsule Take 1 Cap by mouth three (3) times daily. Max Daily Amount: 300 mg. Indications: FIBROMYALGIA (Patient taking differently: Take 100 mg by mouth two (2) times a day.)    FARXIGA 5 mg tab tablet Take 5 mg by mouth daily.  cetirizine (ZYRTEC) 10 mg tablet Take 10 mg by mouth daily.  metFORMIN (GLUCOPHAGE) 1,000 mg tablet Take 1,000 mg by mouth two (2) times daily (with meals).  montelukast (SINGULAIR) 10 mg tablet Take 10 mg by mouth as needed.  tiotropium (SPIRIVA WITH HANDIHALER) 18 mcg inhalation capsule Take 1 Cap by inhalation daily.  cloNIDine (CATAPRES) 0.1 mg tablet Take 0.1 mg by mouth nightly.  losartan-hydrochlorothiazide (HYZAAR) 100-25 mg per tablet Take 1 Tab by mouth daily.  potassium chloride SA (MICRO-K) 10 mEq capsule Take 10 mEq by mouth daily.  ergocalciferol (VITAMIN D2) 50,000 unit capsule Take 50,000 Units by mouth every seven (7) days. Disposition:  Home    DISCHARGE NOTE:     Pt has been reexamined. Patient has no new complaints, changes, or physical findings. Care plan outlined and precautions discussed. Results of labs and exam were reviewed with the patient. All medications were reviewed with the patient; will d/c home with Phenergan suppositiories. All of pt's questions and concerns were addressed.  Patient was instructed and agrees to follow up with primary care, as well as to return to the ED upon further deterioration. Patient is ready to go home. Follow-up Information     Follow up With Specialties Details Why Contact Info    MARIKA Galaviz Physician Assistant Schedule an appointment as soon as possible for a visit for follow up [address]  [address] [facility] EMERGENCY DEPT Emergency Medicine   [address] [place] [address]  [phone]          Discharge Medication List as of 3/22/2020  7:39 PM              Diagnosis     Clinical Impression:   1. Non-intractable vomiting with nausea, unspecified vomiting type    2.  Constipation, unspecified constipation type

## 2020-07-22 DIAGNOSIS — M17.12 PRIMARY OSTEOARTHRITIS OF LEFT KNEE: ICD-10-CM

## 2020-07-27 RX ORDER — DICLOFENAC SODIUM 50 MG/1
TABLET, DELAYED RELEASE ORAL
Qty: 180 TAB | Refills: 1 | Status: SHIPPED | OUTPATIENT
Start: 2020-07-27 | End: 2021-01-19

## 2020-09-08 DIAGNOSIS — E78.5 HYPERLIPIDEMIA, UNSPECIFIED HYPERLIPIDEMIA TYPE: ICD-10-CM

## 2020-09-08 RX ORDER — ATORVASTATIN CALCIUM 20 MG/1
TABLET, FILM COATED ORAL
Qty: 90 TAB | Refills: 1 | Status: SHIPPED | OUTPATIENT
Start: 2020-09-08 | End: 2021-03-15

## 2020-10-19 DIAGNOSIS — M17.12 PRIMARY OSTEOARTHRITIS OF LEFT KNEE: ICD-10-CM

## 2020-10-19 RX ORDER — DICLOFENAC SODIUM 10 MG/G
2 GEL TOPICAL 4 TIMES DAILY
Qty: 100 G | Refills: 2 | Status: SHIPPED | OUTPATIENT
Start: 2020-10-19 | End: 2021-12-13

## 2020-10-19 NOTE — TELEPHONE ENCOUNTER
Methylcellulose (Citrucel) powder/ oral suspension  last filled 10/27/17 #850 5 rf  Pt last seen 4/2/19 for chronic constipation  Return in about 7 months (around 11/2/2019), or if symptoms worsen or fail to improve, for Physical  Nov 11/4/19  Ok rx #[id number] rf  rx sent to [facility] via e[facility]     Rx Approved

## 2020-10-22 ENCOUNTER — OFFICE VISIT (OUTPATIENT)
Dept: CARDIOLOGY CLINIC | Age: 65
End: 2020-10-22
Payer: COMMERCIAL

## 2020-10-22 VITALS
SYSTOLIC BLOOD PRESSURE: 154 MMHG | HEART RATE: 82 BPM | HEIGHT: 65 IN | TEMPERATURE: 97.9 F | DIASTOLIC BLOOD PRESSURE: 74 MMHG | BODY MASS INDEX: 32.15 KG/M2 | WEIGHT: 193 LBS

## 2020-10-22 DIAGNOSIS — I10 ESSENTIAL HYPERTENSION: Primary | ICD-10-CM

## 2020-10-22 DIAGNOSIS — R06.02 SOB (SHORTNESS OF BREATH) ON EXERTION: ICD-10-CM

## 2020-10-22 DIAGNOSIS — E78.5 HYPERLIPIDEMIA, UNSPECIFIED HYPERLIPIDEMIA TYPE: ICD-10-CM

## 2020-10-22 DIAGNOSIS — E66.01 CLASS 2 SEVERE OBESITY WITH SERIOUS COMORBIDITY AND BODY MASS INDEX (BMI) OF 38.0 TO 38.9 IN ADULT, UNSPECIFIED OBESITY TYPE (HCC): ICD-10-CM

## 2020-10-22 PROCEDURE — 99214 OFFICE O/P EST MOD 30 MIN: CPT | Performed by: INTERNAL MEDICINE

## 2020-10-22 RX ORDER — UMECLIDINIUM 62.5 UG/1
1 AEROSOL, POWDER ORAL DAILY
COMMUNITY

## 2020-10-22 RX ORDER — OMEPRAZOLE 20 MG/1
20 CAPSULE, DELAYED RELEASE ORAL DAILY
COMMUNITY

## 2020-10-22 NOTE — PROGRESS NOTES
HISTORY OF PRESENT ILLNESS  Juanjo Arzate is a 72 y.o. female. Hypertension   The history is provided by the patient. This is a chronic problem. The problem occurs constantly. Associated symptoms include shortness of breath. Pertinent negatives include no chest pain, no abdominal pain and no headaches. Shortness of Breath   The history is provided by the patient. This is a recurrent problem. The problem occurs intermittently. The problem has not changed since onset. Pertinent negatives include no fever, no headaches, no cough, no sputum production, no hemoptysis, no wheezing, no PND, no orthopnea, no chest pain, no vomiting, no abdominal pain, no rash, no leg swelling and no claudication. Precipitated by: exertion. Review of Systems   Constitutional: Negative for chills and fever. HENT: Negative for nosebleeds. Eyes: Negative for blurred vision and double vision. Respiratory: Positive for shortness of breath. Negative for cough, hemoptysis, sputum production and wheezing. Cardiovascular: Negative for chest pain, palpitations, orthopnea, claudication, leg swelling and PND. Gastrointestinal: Negative for abdominal pain, heartburn, nausea and vomiting. Musculoskeletal: Positive for joint pain. Negative for myalgias. Skin: Negative for rash. Neurological: Negative for dizziness, weakness and headaches. Endo/Heme/Allergies: Does not bruise/bleed easily.      Family History   Problem Relation Age of Onset    No Known Problems Mother     No Known Problems Father        Past Medical History:   Diagnosis Date    Anemia     Arthritis     Asthma     Chronic pain     chronic back pain    Chronic sinusitis     Diabetes ([address]     Fibromyalgia     GERD (gastroesophageal reflux disease)     Hepatitis B 10 years ago    Hypertension     Unspecified sleep apnea     uses cpap sometimes       Past Surgical History:   Procedure Laterality Date    HX BREAST LUMPECTOMY  2011    right breast    HX  SECTION      HX CHOLECYSTECTOMY      HX GYN      tubal ligation    HX ORTHOPAEDIC      right knee replacement    HX ORTHOPAEDIC Left     carpal tunnel       Social History     Tobacco Use    Smoking status: Never Smoker    Smokeless tobacco: Never Used   Substance Use Topics    Alcohol use: No       Allergies   Allergen Reactions    Latex, Natural Rubber Rash     Latex tape causes rash to area    Celebrex [Celecoxib] Hives    Iodine Hives    Sulfa (Sulfonamide Antibiotics) Hives       Prior to Admission medications    Medication Sig Start Date End Date Taking? Authorizing Provider   umeclidinium (Incruse Ellipta) 62.5 mcg/actuation inhaler Take 1 Puff by inhalation daily. Yes Provider, Historical   omeprazole (PRILOSEC) 20 mg capsule Take 20 mg by mouth daily. Yes Provider, Historical   diclofenac (VOLTAREN) 1 % gel APPLY 2 G TO AFFECTED AREA FOUR (4) TIMES DAILY. 10/19/20  Yes Dustin Lopez MD   atorvastatin (LIPITOR) 20 mg tablet TAKE 1 TABLET BY MOUTH EVERY DAY 20  Yes Jory Flores NP   diclofenac EC (VOLTAREN) 50 mg EC tablet TAKE 1 TABLET BY MOUTH TWICE A DAY WITH MEALS 20  Yes Dustin Lopez MD   cyclobenzaprine (FLEXERIL) 10 mg tablet Take 1 Tab by mouth three (3) times daily as needed. 17  Yes Ximena Hale NP   pregabalin (LYRICA) 100 mg capsule Take 1 Cap by mouth three (3) times daily. Max Daily Amount: 300 mg. Indications: FIBROMYALGIA  Patient taking differently: Take 100 mg by mouth two (2) times a day. 17  Yes Emelia Weems MD   metFORMIN (GLUCOPHAGE) 1,000 mg tablet Take 1,000 mg by mouth two (2) times daily (with meals). 9/24/15  Yes Provider, Historical   cloNIDine (CATAPRES) 0.1 mg tablet Take 0.1 mg by mouth nightly. Yes Provider, Historical   losartan-hydrochlorothiazide (HYZAAR) 100-25 mg per tablet Take 1 Tab by mouth daily. Yes Provider, Historical   potassium chloride SA (MICRO-K) 10 mEq capsule Take 10 mEq by mouth daily. Yes Provider, Historical   ergocalciferol (VITAMIN D2) 50,000 unit capsule Take 50,000 Units by mouth every seven (7) days. Yes Provider, Historical         Visit Vitals  BP (!) 154/74   Pulse 82   Temp 97.9 °F (36.6 °C)   Ht 5' 5\" (1.651 m)   Wt 87.5 kg (193 lb)   BMI 32.12 kg/m²       Physical Exam   Constitutional: She is oriented to person, place, and time. She appears well-developed and well-nourished. HENT:   Head: Normocephalic and atraumatic. Eyes: Conjunctivae are normal.   Neck: Neck supple. No JVD present. No tracheal deviation present. No thyromegaly present. Cardiovascular: Normal rate and regular rhythm. PMI is not displaced. Exam reveals no gallop, no S3 and no decreased pulses. Murmur heard. Early systolic murmur is present at the upper right sternal border. Pulmonary/Chest: No respiratory distress. She has no wheezes. She has no rales. She exhibits no tenderness. Abdominal: Soft. There is no abdominal tenderness. Musculoskeletal:         General: No edema. Neurological: She is alert and oriented to person, place, and time. Skin: Skin is warm. Psychiatric: She has a normal mood and affect. Ms. Mikie Dimas has a reminder for a \"due or due soon\" health maintenance. I have asked that she contact her primary care provider for follow-up on this health maintenance. I have personally reviewed patients ekg done at other facility. I Have personally reviewed recent relevant labs available and discussed with patient  cxr,labs  SUMMARY:echo-10/2017  Left ventricle: Systolic function was normal. Ejection fraction was  estimated in the range of 55 % to 60 %. There were no regional wall motion  abnormalities. There was mild concentric hypertrophy. Doppler parameters  were consistent with abnormal left ventricular relaxation (grade 1  diastolic dysfunction). Mitral valve: There was mild annular calcification. There was trivial  regurgitation. Tricuspid valve:  There was trivial regurgitation. NUCLEAR IMAGING:10/2017  Findings:   1. Stress images reveal normal Myoview distrubution in all the LV segments in short axis, vertical and horizontal long axis views. 2. Resting images have a normal uptake. 3. Gated images reveal normal wall motion and the ejection fraction is calculated to be 76%. Conclusion:   1. Normal perfusion scan. 2. Normal wall motion and ejection fraction. 3. No evidence of significant fixed or reversible defect suggesting ischemia or myocardial infarction noted from this nuclear study. 4. Low risk scan. Care Everywhere Result Report  Lipid Complete PanelResulted: 7/16/2020 2:43 PM  [address]  Component Name Value Ref Range   Cholesterol 104 (L) 110 - 200 mg/dL   Triglyceride 68 40 - 149 mg/dL   HDL 46 >=40 mg/dL   Cholesterol/HDL 2.3 0.0 - 5.0    Non-HDL Cholesterol 58 <130 mg/dL   LDL CALCULATION 45 (L) 50 - 99 mg/dL   VLDL CALCULATION 14 8 - 30 mg/dL   LDL/HDL Ratio 1.0         Assessment           ICD-10-CM ICD-9-CM    1. Essential hypertension  I10 401.9     Blood pressure elevated in office. Check home readings and consider additional medicine as needed   2. Hyperlipidemia, unspecified hyperlipidemia type  E78.5 272.4     Started on statin. Follow-up lab. LDL was better after starting treatment   3. SOB (shortness of breath) on exertion  R06.02 786.05     Stable multifactorial monitor   4. Class 2 severe obesity with serious comorbidity and body mass index (BMI) of 38.0 to 38.9 in adult, unspecified obesity type ([facility]  E66.01 278.01     Z68.38 V85.38     Continue with diet and exercise. Monitor weight loss     10/2018 -Stable, SOB with exertion at times. Normal NST and echo 10/2017. B/P elevated, has been under increased stress due to 's illness. To monitor b/p at home. Lipids followed by PCP. 10/2019  Stable shortness of breath exertion and atypical chest pain. Has noticed weight gain continue with diet and exercise.   Currently not on any lipid-lowering agent. Check lipids and consider statin if LDL is high  10/2020  Cardiac status stable. Blood pressure elevated start home monitoring and decide on additional medicine. LDL was high and she is now on statin with good controlled 5. Continue other treatment  Medications Discontinued During This Encounter   Medication Reason    tiotropium (SPIRIVA WITH HANDIHALER) 18 mcg inhalation capsule Dose Adjustment    Azelastine (ASTEPRO) 0.15 % (205.5 mcg) nasal spray Therapy Completed    oxyCODONE-acetaminophen (PERCOCET) 5-325 mg per tablet Therapy Completed    pantoprazole (PROTONIX) 20 mg tablet Dose Adjustment    cetirizine (ZYRTEC) 10 mg tablet Therapy Completed    ondansetron (ZOFRAN ODT) 4 mg disintegrating tablet Therapy Completed    traMADol (ULTRAM) 50 mg tablet Therapy Completed    FARXIGA 5 mg tab tablet Discontinued by Another Clinician    KLOR-CON M10 10 mEq tablet Duplicate Order    montelukast (SINGULAIR) 10 mg tablet Therapy Completed       No orders of the defined types were placed in this encounter.         Evelyn Pritchett MD

## 2020-10-22 NOTE — PROGRESS NOTES
1. Have you been to the ER, urgent care clinic since your last visit? Hospitalized since your last visit? yes [facility]    2. Have you seen or consulted any other health care providers outside of the [address] [place] [address] since your last visit? Include any pap smears or colon screening.  Yes pcp

## 2020-11-20 ENCOUNTER — OFFICE VISIT (OUTPATIENT)
Dept: CARDIOLOGY CLINIC | Age: 65
End: 2020-11-20
Payer: COMMERCIAL

## 2020-11-20 VITALS
SYSTOLIC BLOOD PRESSURE: 132 MMHG | TEMPERATURE: 97.7 F | HEART RATE: 85 BPM | BODY MASS INDEX: 33.32 KG/M2 | WEIGHT: 200 LBS | DIASTOLIC BLOOD PRESSURE: 64 MMHG | HEIGHT: 65 IN

## 2020-11-20 DIAGNOSIS — R55 SYNCOPE, UNSPECIFIED SYNCOPE TYPE: Primary | ICD-10-CM

## 2020-11-20 DIAGNOSIS — R07.9 CHEST PAIN, UNSPECIFIED TYPE: ICD-10-CM

## 2020-11-20 DIAGNOSIS — E78.5 HYPERLIPIDEMIA, UNSPECIFIED HYPERLIPIDEMIA TYPE: ICD-10-CM

## 2020-11-20 DIAGNOSIS — I10 ESSENTIAL HYPERTENSION: ICD-10-CM

## 2020-11-20 DIAGNOSIS — R06.02 SOB (SHORTNESS OF BREATH) ON EXERTION: ICD-10-CM

## 2020-11-20 PROCEDURE — 99214 OFFICE O/P EST MOD 30 MIN: CPT | Performed by: INTERNAL MEDICINE

## 2020-11-20 RX ORDER — METOPROLOL SUCCINATE 50 MG/1
50 TABLET, EXTENDED RELEASE ORAL DAILY
Qty: 90 TAB | Refills: 3 | Status: SHIPPED | OUTPATIENT
Start: 2020-11-20 | End: 2020-11-20 | Stop reason: SDUPTHER

## 2020-11-20 RX ORDER — LOSARTAN POTASSIUM 50 MG/1
50 TABLET ORAL DAILY
Qty: 90 TAB | Refills: 3 | Status: SHIPPED | OUTPATIENT
Start: 2020-11-20 | End: 2020-11-20 | Stop reason: SDUPTHER

## 2020-11-20 RX ORDER — METOPROLOL SUCCINATE 50 MG/1
50 TABLET, EXTENDED RELEASE ORAL DAILY
Qty: 90 TAB | Refills: 3 | Status: SHIPPED | OUTPATIENT
Start: 2020-11-20 | End: 2021-03-29

## 2020-11-20 RX ORDER — LOSARTAN POTASSIUM 50 MG/1
50 TABLET ORAL DAILY
Qty: 90 TAB | Refills: 3 | Status: SHIPPED | OUTPATIENT
Start: 2020-11-20 | End: 2021-12-13

## 2020-11-20 RX ORDER — CYANOCOBALAMIN 1000 UG/ML
1000 INJECTION, SOLUTION INTRAMUSCULAR; SUBCUTANEOUS ONCE
COMMUNITY
End: 2021-03-29

## 2020-11-20 NOTE — PROGRESS NOTES
HISTORY OF PRESENT ILLNESS  Abdulaziz Ferreira is a 72 y.o. female. 11/20/2020  Patient presents with episodes of syncope she has been having these episodes on and off for recently worse. Last month when she was sitting in suddenly felt hot nauseous and passed out. She had another episode when she had vomited some blood in subsequently passed out. She has autonomic neuropathy due to diabetes. She also complains of substernal chest pain not related to activity exertion denies any clear radiation. She has increased shortness of breath on exertion. She has episodes of palpitation    Hypertension   The history is provided by the patient. This is a chronic problem. The problem occurs constantly. Associated symptoms include chest pain and shortness of breath. Pertinent negatives include no abdominal pain and no headaches. Shortness of Breath   The history is provided by the patient. This is a recurrent problem. The problem occurs intermittently. The problem has not changed since onset. Associated symptoms include chest pain. Pertinent negatives include no fever, no headaches, no cough, no sputum production, no hemoptysis, no wheezing, no PND, no orthopnea, no vomiting, no abdominal pain, no rash, no leg swelling and no claudication. Precipitated by: exertion. Cholesterol Problem   Associated symptoms include chest pain and shortness of breath. Pertinent negatives include no abdominal pain and no headaches. Review of Systems   Constitutional: Negative for chills and fever. HENT: Negative for nosebleeds. Eyes: Negative for blurred vision and double vision. Respiratory: Positive for shortness of breath. Negative for cough, hemoptysis, sputum production and wheezing. Cardiovascular: Positive for chest pain and palpitations. Negative for orthopnea, claudication, leg swelling and PND. Gastrointestinal: Negative for abdominal pain, heartburn, nausea and vomiting. Musculoskeletal: Positive for joint pain. Negative for myalgias. Skin: Negative for rash. Neurological: Positive for loss of consciousness. Negative for dizziness, weakness and headaches. Endo/Heme/Allergies: Does not bruise/bleed easily. Family History   Problem Relation Age of Onset    No Known Problems Mother     No Known Problems Father        Past Medical History:   Diagnosis Date    Anemia     Arthritis     Asthma     Chronic pain     chronic back pain    Chronic sinusitis     Diabetes ([address]     Fibromyalgia     GERD (gastroesophageal reflux disease)     Hepatitis B 10 years ago    Hypertension     Unspecified sleep apnea     uses cpap sometimes       Past Surgical History:   Procedure Laterality Date    HX BREAST LUMPECTOMY  2011    right breast    HX  SECTION      HX CHOLECYSTECTOMY      HX GYN      tubal ligation    HX ORTHOPAEDIC      right knee replacement    HX ORTHOPAEDIC Left     carpal tunnel       Social History     Tobacco Use    Smoking status: Never Smoker    Smokeless tobacco: Never Used   Substance Use Topics    Alcohol use: No       Allergies   Allergen Reactions    Latex, Natural Rubber Rash     Latex tape causes rash to area    Celebrex [Celecoxib] Hives    Iodine Hives    Sulfa (Sulfonamide Antibiotics) Hives and Itching       Prior to Admission medications    Medication Sig Start Date End Date Taking? Authorizing Provider   cyanocobalamin (VITAMIN B12) 1,000 mcg/mL injection 1,000 mcg by IntraMUSCular route once. Yes Provider, Historical   losartan (COZAAR) 50 mg tablet Take 1 Tab by mouth daily. 20  Yes Valeriy Willard MD   metoprolol succinate (TOPROL-XL) 50 mg XL tablet Take 1 Tab by mouth daily. 20  Yes Valeriy Willard MD   umeclidinium (Incruse Ellipta) 62.5 mcg/actuation inhaler Take 1 Puff by inhalation daily. Yes Provider, Historical   omeprazole (PRILOSEC) 20 mg capsule Take 20 mg by mouth daily.    Yes Provider, Historical   diclofenac (VOLTAREN) 1 % gel APPLY 2 G TO AFFECTED AREA FOUR (4) TIMES DAILY. 10/19/20  Yes Goldie Oro MD   atorvastatin (LIPITOR) 20 mg tablet TAKE 1 TABLET BY MOUTH EVERY DAY 9/8/20  Yes Jory Flores NP   diclofenac EC (VOLTAREN) 50 mg EC tablet TAKE 1 TABLET BY MOUTH TWICE A DAY WITH MEALS 7/27/20  Yes Nelda Lorenzo MD   pregabalin (LYRICA) 100 mg capsule Take 1 Cap by mouth three (3) times daily. Max Daily Amount: 300 mg. Indications: FIBROMYALGIA  Patient taking differently: Take 100 mg by mouth two (2) times a day. 2/1/17  Yes Deja Garcia MD   metFORMIN (GLUCOPHAGE) 1,000 mg tablet Take 1,000 mg by mouth two (2) times daily (with meals). 9/24/15  Yes Provider, Historical   cloNIDine (CATAPRES) 0.1 mg tablet Take 0.1 mg by mouth nightly. Yes Provider, Historical   potassium chloride SA (MICRO-K) 10 mEq capsule Take 10 mEq by mouth daily. Yes Provider, Historical   ergocalciferol (VITAMIN D2) 50,000 unit capsule Take 50,000 Units by mouth every seven (7) days. Yes Provider, Historical         Visit Vitals  /64   Pulse 85   Temp 97.7 °F (36.5 °C)   Ht 5' 5\" (1.651 m)   Wt 90.7 kg (200 lb)   BMI 33.28 kg/m²       Physical Exam   Constitutional: She is oriented to person, place, and time. She appears well-developed and well-nourished. HENT:   Head: Normocephalic and atraumatic. Eyes: Conjunctivae are normal.   Neck: Neck supple. No JVD present. No tracheal deviation present. No thyromegaly present. Cardiovascular: Normal rate and regular rhythm. PMI is not displaced. Exam reveals no gallop, no S3 and no decreased pulses. Murmur heard. Early systolic murmur is present at the upper right sternal border. Pulmonary/Chest: No respiratory distress. She has no wheezes. She has no rales. She exhibits no tenderness. Abdominal: Soft. There is no abdominal tenderness. Musculoskeletal:         General: No edema. Neurological: She is alert and oriented to person, place, and time. Skin: Skin is warm. Psychiatric: She has a normal mood and affect. Ms. Theron Resendiz has a reminder for a \"due or due soon\" health maintenance. I have asked that she contact her primary care provider for follow-up on this health maintenance. I have personally reviewed patients ekg done at other facility. I Have personally reviewed recent relevant labs available and discussed with patient  cxr,labs  SUMMARY:echo-10/2017  Left ventricle: Systolic function was normal. Ejection fraction was  estimated in the range of 55 % to 60 %. There were no regional wall motion  abnormalities. There was mild concentric hypertrophy. Doppler parameters  were consistent with abnormal left ventricular relaxation (grade 1  diastolic dysfunction). Mitral valve: There was mild annular calcification. There was trivial  regurgitation. Tricuspid valve: There was trivial regurgitation. NUCLEAR IMAGING:10/2017  Findings:   1. Stress images reveal normal Myoview distrubution in all the LV segments in short axis, vertical and horizontal long axis views. 2. Resting images have a normal uptake. 3. Gated images reveal normal wall motion and the ejection fraction is calculated to be 76%. Conclusion:   1. Normal perfusion scan. 2. Normal wall motion and ejection fraction. 3. No evidence of significant fixed or reversible defect suggesting ischemia or myocardial infarction noted from this nuclear study. 4. Low risk scan. Care Everywhere Result Report  Lipid Complete PanelResulted: 7/16/2020 2:43 PM  [address]  Component Name Value Ref Range   Cholesterol 104 (L) 110 - 200 mg/dL   Triglyceride 68 40 - 149 mg/dL   HDL 46 >=40 mg/dL   Cholesterol/HDL 2.3 0.0 - 5.0    Non-HDL Cholesterol 58 <130 mg/dL   LDL CALCULATION 45 (L) 50 - 99 mg/dL   VLDL CALCULATION 14 8 - 30 mg/dL   LDL/HDL Ratio 1.0         Assessment           ICD-10-CM ICD-9-CM    1.  Syncope, unspecified syncope type  R55 780.2 ECHO ADULT COMPLETE      NUCLEAR CARDIAC STRESS TEST Appears to be combination of vasovagal episode, autonomic dysfunction. Less likely arrhythmia. 2. SOB (shortness of breath) on exertion  R06.02 786.05 ECHO ADULT COMPLETE      NUCLEAR CARDIAC STRESS TEST    Rule out LV dysfunction. Rule out valvular disease   3. Chest pain, unspecified type  R07.9 786.50 ECHO ADULT COMPLETE      NUCLEAR CARDIAC STRESS TEST    Atypical angina rule out ischemia. Possible GERD   4. Essential hypertension  I10 401.9     Adjust medication for possible orthostatic hypotension and autonomic dysfunction   5. Hyperlipidemia, unspecified hyperlipidemia type  E78.5 272.4      10/2018 -Stable, SOB with exertion at times. Normal NST and echo 10/2017. B/P elevated, has been under increased stress due to 's illness. To monitor b/p at home. Lipids followed by PCP. 10/2019  Stable shortness of breath exertion and atypical chest pain. Has noticed weight gain continue with diet and exercise. Currently not on any lipid-lowering agent. Check lipids and consider statin if LDL is high  10/2020  Cardiac status stable. Blood pressure elevated start home monitoring and decide on additional medicine. LDL was high and she is now on statin with good controlled LDL. Continue other treatment  11/2020  Seen for episodes of syncope possible vagal or orthostatic. Has autonomic neuropathy due to diabetes. Also complained of chest pain possible angina and increased shortness of breath. Losartan HCTZ discontinued.   I have added losartan and Toprol for blood pressure control check echo and stress test  Medications Discontinued During This Encounter   Medication Reason    losartan-hydrochlorothiazide (HYZAAR) 100-25 mg per tablet Alternate Therapy       Orders Placed This Encounter    ECHO ADULT COMPLETE     Standing Status:   Future     Standing Expiration Date:   11/20/2021     Order Specific Question:   Contrast Enhancement (Bubble Study, Definity, Optison) may be used if criteria listed in established evidence-based protocol has been identified. Answer: Yes    losartan (COZAAR) 50 mg tablet     Sig: Take 1 Tab by mouth daily. Dispense:  90 Tab     Refill:  3    metoprolol succinate (TOPROL-XL) 50 mg XL tablet     Sig: Take 1 Tab by mouth daily.      Dispense:  90 Tab     Refill:  3         Jacinta Quinones MD

## 2020-12-18 ENCOUNTER — TELEPHONE (OUTPATIENT)
Dept: CARDIOLOGY CLINIC | Age: 65
End: 2020-12-18

## 2020-12-18 NOTE — TELEPHONE ENCOUNTER
Called and spoke to patient per Dr. Robin gregg to do that. Patient was also on Voltaren tablets which she should use only as needed as it can increase the risk of bleeding when taken together. She voices understanding and acceptance of this advice and will call back if any further questions or concerns.

## 2020-12-18 NOTE — TELEPHONE ENCOUNTER
Patient called and stated she had a x-ray done ant they found blood clots in her lungs. She was prescribe xarelto 15 mg bid for 21 days. Next 20 mg daily for 21 days. She starts medication today.

## 2020-12-18 NOTE — TELEPHONE ENCOUNTER
Okay to do that.   Patient was also on Voltaren tablets which she should use only as needed as it can increase the risk of bleeding when taken together

## 2021-01-05 ENCOUNTER — TRANSCRIBE ORDER (OUTPATIENT)
Dept: SCHEDULING | Age: 66
End: 2021-01-05

## 2021-01-05 DIAGNOSIS — M79.605 LEFT LEG PAIN: Primary | ICD-10-CM

## 2021-01-08 ENCOUNTER — HOSPITAL ENCOUNTER (OUTPATIENT)
Dept: VASCULAR SURGERY | Age: 66
Discharge: HOME | End: 2021-01-08
Attending: PHYSICIAN ASSISTANT
Payer: MEDICARE

## 2021-01-08 DIAGNOSIS — M79.605 LEFT LEG PAIN: ICD-10-CM

## 2021-01-08 PROCEDURE — 93971 EXTREMITY STUDY: CPT

## 2021-01-17 DIAGNOSIS — M17.12 PRIMARY OSTEOARTHRITIS OF LEFT KNEE: ICD-10-CM

## 2021-01-19 RX ORDER — DICLOFENAC SODIUM 50 MG/1
TABLET, DELAYED RELEASE ORAL
Qty: 180 TAB | Refills: 1 | Status: SHIPPED | OUTPATIENT
Start: 2021-01-19 | End: 2021-07-11

## 2021-03-13 DIAGNOSIS — E78.5 HYPERLIPIDEMIA, UNSPECIFIED HYPERLIPIDEMIA TYPE: ICD-10-CM

## 2021-03-15 RX ORDER — ATORVASTATIN CALCIUM 20 MG/1
TABLET, FILM COATED ORAL
Qty: 90 TAB | Refills: 1 | Status: SHIPPED | OUTPATIENT
Start: 2021-03-15 | End: 2021-10-27

## 2021-03-29 RX ORDER — LANOLIN ALCOHOL/MO/W.PET/CERES
1000 CREAM (GRAM) TOPICAL DAILY
COMMUNITY

## 2021-03-31 ENCOUNTER — ANESTHESIA EVENT (OUTPATIENT)
Dept: ENDOSCOPY | Age: 66
End: 2021-03-31
Payer: MEDICARE

## 2021-04-01 ENCOUNTER — ANESTHESIA (OUTPATIENT)
Dept: ENDOSCOPY | Age: 66
End: 2021-04-01
Payer: MEDICARE

## 2021-04-01 ENCOUNTER — HOSPITAL ENCOUNTER (OUTPATIENT)
Age: 66
Setting detail: OUTPATIENT SURGERY
Discharge: HOME | End: 2021-04-01
Attending: INTERNAL MEDICINE | Admitting: INTERNAL MEDICINE
Payer: MEDICARE

## 2021-04-01 VITALS
RESPIRATION RATE: 17 BRPM | TEMPERATURE: 97.7 F | HEIGHT: 65 IN | HEART RATE: 64 BPM | SYSTOLIC BLOOD PRESSURE: 150 MMHG | BODY MASS INDEX: 32.18 KG/M2 | OXYGEN SATURATION: 99 % | DIASTOLIC BLOOD PRESSURE: 86 MMHG | WEIGHT: 193.13 LBS

## 2021-04-01 LAB
GLUCOSE BLD STRIP.AUTO-MCNC: 129 MG/DL (ref 70–110)
GLUCOSE BLD STRIP.AUTO-MCNC: 136 MG/DL (ref 70–110)

## 2021-04-01 PROCEDURE — 74011250636 HC RX REV CODE- 250/636: Performed by: ANESTHESIOLOGY

## 2021-04-01 PROCEDURE — 76060000031 HC ANESTHESIA FIRST 0.5 HR: Performed by: INTERNAL MEDICINE

## 2021-04-01 PROCEDURE — 77030008565 HC TBNG SUC IRR ERBE -B: Performed by: INTERNAL MEDICINE

## 2021-04-01 PROCEDURE — 74011000250 HC RX REV CODE- 250: Performed by: ANESTHESIOLOGY

## 2021-04-01 PROCEDURE — 00731 ANES UPR GI NDSC PX NOS: CPT | Performed by: ANESTHESIOLOGY

## 2021-04-01 PROCEDURE — 77030019988 HC FCPS ENDOSC DISP BSC -B: Performed by: INTERNAL MEDICINE

## 2021-04-01 PROCEDURE — 76040000019: Performed by: INTERNAL MEDICINE

## 2021-04-01 PROCEDURE — 2709999900 HC NON-CHARGEABLE SUPPLY: Performed by: INTERNAL MEDICINE

## 2021-04-01 PROCEDURE — 88342 IMHCHEM/IMCYTCHM 1ST ANTB: CPT

## 2021-04-01 PROCEDURE — 74011250636 HC RX REV CODE- 250/636: Performed by: NURSE ANESTHETIST, CERTIFIED REGISTERED

## 2021-04-01 PROCEDURE — 82962 GLUCOSE BLOOD TEST: CPT

## 2021-04-01 PROCEDURE — 88305 TISSUE EXAM BY PATHOLOGIST: CPT

## 2021-04-01 RX ORDER — FAMOTIDINE 20 MG/1
20 TABLET, FILM COATED ORAL ONCE
Status: DISCONTINUED | OUTPATIENT
Start: 2021-04-01 | End: 2021-04-01

## 2021-04-01 RX ORDER — LIDOCAINE HYDROCHLORIDE 20 MG/ML
INJECTION, SOLUTION EPIDURAL; INFILTRATION; INTRACAUDAL; PERINEURAL AS NEEDED
Status: DISCONTINUED | OUTPATIENT
Start: 2021-04-01 | End: 2021-04-01 | Stop reason: HOSPADM

## 2021-04-01 RX ORDER — INSULIN LISPRO 100 [IU]/ML
INJECTION, SOLUTION INTRAVENOUS; SUBCUTANEOUS ONCE
Status: DISCONTINUED | OUTPATIENT
Start: 2021-04-01 | End: 2021-04-01 | Stop reason: HOSPADM

## 2021-04-01 RX ORDER — PROPOFOL 10 MG/ML
INJECTION, EMULSION INTRAVENOUS AS NEEDED
Status: DISCONTINUED | OUTPATIENT
Start: 2021-04-01 | End: 2021-04-01 | Stop reason: HOSPADM

## 2021-04-01 RX ORDER — SODIUM CHLORIDE, SODIUM LACTATE, POTASSIUM CHLORIDE, CALCIUM CHLORIDE 600; 310; 30; 20 MG/100ML; MG/100ML; MG/100ML; MG/100ML
25 INJECTION, SOLUTION INTRAVENOUS CONTINUOUS
Status: DISCONTINUED | OUTPATIENT
Start: 2021-04-01 | End: 2021-04-01 | Stop reason: HOSPADM

## 2021-04-01 RX ADMIN — SODIUM CHLORIDE, SODIUM LACTATE, POTASSIUM CHLORIDE, AND CALCIUM CHLORIDE 25 ML/HR: 600; 310; 30; 20 INJECTION, SOLUTION INTRAVENOUS at 09:00

## 2021-04-01 RX ADMIN — LIDOCAINE HYDROCHLORIDE 50 MG: 20 INJECTION, SOLUTION EPIDURAL; INFILTRATION; INTRACAUDAL; PERINEURAL at 09:40

## 2021-04-01 RX ADMIN — FAMOTIDINE: 10 INJECTION INTRAVENOUS at 09:10

## 2021-04-01 RX ADMIN — PROPOFOL 70 MG: 10 INJECTION, EMULSION INTRAVENOUS at 09:40

## 2021-04-01 NOTE — ANESTHESIA PREPROCEDURE EVALUATION
Relevant Problems   No relevant active problems       Anesthetic History   No history of anesthetic complications            Review of Systems / Medical History  Patient summary reviewed, nursing notes reviewed and pertinent labs reviewed    Pulmonary        Sleep apnea: No treatment    Asthma        Neuro/Psych   Within defined limits           Cardiovascular    Hypertension          Hyperlipidemia      Comments: 01/2021 stress neg    01/2021 ECHO  Estimated LVEF is 50 - 55%   GI/Hepatic/Renal     GERD  Hepatitis: type B         Endo/Other    Diabetes    Obesity     Other Findings            Physical Exam    Airway  Mallampati: II  TM Distance: 4 - 6 cm  Neck ROM: normal range of motion   Mouth opening: Normal     Cardiovascular    Rhythm: regular           Dental    Dentition: Lower dentition intact and Upper dentition intact     Pulmonary  Breath sounds clear to auscultation               Abdominal  GI exam deferred       Other Findings            Anesthetic Plan    ASA: 3  Anesthesia type: MAC            Anesthetic plan and risks discussed with: Patient

## 2021-04-01 NOTE — ANESTHESIA POSTPROCEDURE EVALUATION
Procedure(s):  UPPER ENDOSCOPY with Bx and dilation. MAC    Anesthesia Post Evaluation      Multimodal analgesia: multimodal analgesia used between 6 hours prior to anesthesia start to PACU discharge  Patient location during evaluation: PACU  Patient participation: complete - patient participated  Level of consciousness: awake and alert  Pain management: adequate  Airway patency: patent  Anesthetic complications: no  Cardiovascular status: acceptable and hemodynamically stable  Respiratory status: acceptable  Hydration status: acceptable  Post anesthesia nausea and vomiting:  controlled      INITIAL Post-op Vital signs:   Vitals Value Taken Time   /79 04/01/21 1003   Temp 36.4 °C (97.5 °F) 04/01/21 0951   Pulse 69 04/01/21 1004   Resp 19 04/01/21 1003   SpO2 100 % 04/01/21 1004   Vitals shown include unvalidated device data.

## 2021-04-01 NOTE — DISCHARGE INSTRUCTIONS
Patient Education        Learning About Swallowing Problems  What are swallowing problems? Certain health problems that affect the nervous system can cause trouble swallowing. These conditions include stroke, ALS (also known as Daphne Gehrig's disease), Parkinson's disease, and multiple sclerosis. The muscles and nerves that help move food through the throat and esophagus may not work right. Growths, such as cancer, and other problems with your esophagus can also make it hard to swallow. The esophagus is the tube that leads from your throat to your stomach. How are swallowing problems diagnosed? A doctor or speech therapist will examine you to check for swallowing problems. You may get swallowing tests to check how well your throat muscles work. For these tests, you swallow a special liquid that helps the doctor see your throat and esophagus on an X-ray or video screen. Other tests use a thin, flexible tube called a scope to check for problems with your esophagus. The doctor puts the scope in your mouth and down your throat to look at your esophagus. What are the symptoms? Symptoms of swallowing problems may include:  · Trouble getting food or liquids to go down on the first try. · Gagging, choking, or coughing when you swallow. · Having food or liquids come back up through your throat, mouth, or nose after you swallow. · Feeling like foods or liquids are stuck in some part of your throat or chest.  · Pain when you swallow. How are swallowing problems treated? How swallowing problems are treated depends on the cause. The main goals of treatment will be to help you eat and swallow safely and get good nutrition. This is important for your health and quality of life. You may learn exercises to train your throat muscles to work together so you're able to swallow better. Learning certain ways to put food in your mouth or to position your head while eating may also help.   Your doctor or a speech therapist may recommend changes to your diet to help make it easier to swallow. You may need to avoid certain foods or liquids. You also may need to change the thickness of foods or liquids in your diet. To eat and swallow safely, follow any instructions you get from your doctor or therapist. These ideas may help:  · Sit upright when eating, drinking, and taking pills. · Take small bites of food. Chew completely and swallow before taking another bite. · Take small sips of liquids. · If eating makes you tired, eat smaller but more frequent meals. · Tip your chin down when there is food in your mouth. Where can you learn more? Go to http://www.gray.com/  Enter D018 in the search box to learn more about \"Learning About Swallowing Problems. \"  Current as of: February 26, 2020               Content Version: 12.8  © 2006-2021 [employer]. Care instructions adapted under license by [employer] (which disclaims liability or warranty for this information). If you have questions about a medical condition or this instruction, always ask your healthcare professional. [place] [address] any warranty or liability for your use of this information. Patient Education        Esophageal Dilation: What to Expect at [address] had an esophageal dilation. This procedure can open up narrow areas of the esophagus. After the procedure, you will stay at the hospital or surgery center for 1 to 2 hours. This will allow the medicine to wear off. You will be able to go home after your doctor or nurse checks to make sure that you're not having any problems. This care sheet gives you a general idea about how long it will take for you to recover. But each person recovers at a different pace. Follow the steps below to get better as quickly as possible. How can you care for yourself at home?   Activity    · Rest as much as you need to after you go home.     · You should be able to go back to your usual activities the day after the procedure. Diet    · Follow your doctor's directions for eating after the procedure.     · Drink plenty of fluids (unless your doctor has told you not to). Medicines    · Your doctor will tell you if and when you can restart your medicines. He or she will also give you instructions about taking any new medicines.     · If you take aspirin or some other blood thinner, ask your doctor if and when to start taking it again. Make sure that you understand exactly what your doctor wants you to do.     · If you have a sore throat the day after the procedure, use an over-the-counter spray to numb your throat. Sucking on throat lozenges and gargling with warm salt water may also help relieve your symptoms. Follow-up care is a key part of your treatment and safety. Be sure to make and go to all appointments, and call your doctor if you are having problems. It's also a good idea to know your test results and keep a list of the medicines you take. When should you call for help? Call 911 anytime you think you may need emergency care. For example, call if:    · You passed out (lost consciousness).     · You have trouble breathing.     · Your stools are maroon or very bloody   Call your doctor now or seek immediate medical care if:    · You have new or worse belly pain.     · You have a fever.     · You have new or more blood in your stools.     · You are sick to your stomach and cannot drink fluids.     · You cannot pass stools or gas.     · You have pain that does not get better after you take pain medicine. Watch closely for changes in your health, and be sure to contact your doctor if:    · Your throat still hurts after a day or two.     · You do not get better as expected. Where can you learn more? Go to http://www.gray.com/  Enter J014 in the search box to learn more about \"Esophageal Dilation: What to Expect at Home. \"  Current as of: April 15, 2020               Content Version: 12.8  © 5751-5468 [employer]. Care instructions adapted under license by [employer] (which disclaims liability or warranty for this information). If you have questions about a medical condition or this instruction, always ask your healthcare professional. [address] any warranty or liability for your use of this information. Patient Education        Gastritis: Care Instructions  Your Care Instructions     Gastritis is a sore and upset stomach. It happens when something irritates the stomach lining. Many things can cause it. These include an infection such as the flu or something you ate or drank. Medicines or a sore on the lining of the stomach (ulcer) also can cause it. Your belly may bloat and ache. You may belch, vomit, and feel sick to your stomach. You should be able to relieve the problem by taking medicine. And it may help to change your diet. If gastritis lasts, your doctor may prescribe medicine. Follow-up care is a key part of your treatment and safety. Be sure to make and go to all appointments, and call your doctor if you are having problems. It's also a good idea to know your test results and keep a list of the medicines you take. How can you care for yourself at home? · If your doctor prescribed antibiotics, take them as directed. Do not stop taking them just because you feel better. You need to take the full course of antibiotics. · Be safe with medicines. If your doctor prescribed medicine to decrease stomach acid, take it as directed. Call your doctor if you think you are having a problem with your medicine.   · Do not take any other medicine, including over-the-counter pain relievers, without talking to your doctor first.  · If your doctor recommends over-the-counter medicine to reduce stomach acid, such as Pepcid AC (famotidine), Prilosec (omeprazole), or Tagamet HB (cimetidine) follow the directions on the label. · Drink plenty of fluids to prevent dehydration. Choose water and other caffeine-free clear liquids. If you have kidney, heart, or liver disease and have to limit fluids, talk with your doctor before you increase the amount of fluids you drink. · Limit how much alcohol you drink. · Avoid coffee, tea, cola drinks, chocolate, and other foods with caffeine. They increase stomach acid. When should you call for help? Call 911 anytime you think you may need emergency care. For example, call if:    · You vomit blood or what looks like coffee grounds.     · You pass maroon or very bloody stools. Call your doctor now or seek immediate medical care if:    · You start breathing fast and have not produced urine in the last 8 hours.     · You cannot keep fluids down. Watch closely for changes in your health, and be sure to contact your doctor if:    · You do not get better as expected. Where can you learn more? Go to http://www.godoy.com/  Enter Z536 in the search box to learn more about \"Gastritis: Care Instructions. \"  Current as of: April 15, 2020               Content Version: 12.8  © 1605-7637 [employer]. Care instructions adapted under license by [employer] (which disclaims liability or warranty for this information). If you have questions about a medical condition or this instruction, always ask your healthcare professional. [address] any warranty or liability for your use of this information.        .Patient armband removed and shredded

## 2021-07-11 DIAGNOSIS — M17.12 PRIMARY OSTEOARTHRITIS OF LEFT KNEE: ICD-10-CM

## 2021-07-11 RX ORDER — DICLOFENAC SODIUM 50 MG/1
TABLET, DELAYED RELEASE ORAL
Qty: 180 TABLET | Refills: 1 | Status: SHIPPED | OUTPATIENT
Start: 2021-07-11 | End: 2021-12-13

## 2021-08-18 ENCOUNTER — HOSPITAL ENCOUNTER (EMERGENCY)
Age: 66
Discharge: HOME | End: 2021-08-18

## 2021-08-18 ENCOUNTER — HOSPITAL ENCOUNTER (OUTPATIENT)
Dept: LAB | Age: 66
Discharge: HOME | End: 2021-08-18
Payer: MEDICARE

## 2021-08-18 LAB
ALBUMIN SERPL-MCNC: 3.7 G/DL (ref 3.4–5)
ALBUMIN/GLOB SERPL: 1.1 {RATIO} (ref 0.8–1.7)
ALP SERPL-CCNC: 63 U/L (ref 45–117)
ALT SERPL-CCNC: 17 U/L (ref 13–56)
ANION GAP SERPL CALC-SCNC: 2 MMOL/L (ref 3–18)
AST SERPL-CCNC: 13 U/L (ref 10–38)
BASOPHILS # BLD: 0 K/UL (ref 0–0.1)
BASOPHILS NFR BLD: 1 % (ref 0–2)
BILIRUB SERPL-MCNC: 0.3 MG/DL (ref 0.2–1)
BUN SERPL-MCNC: 17 MG/DL (ref 7–18)
BUN/CREAT SERPL: 18 (ref 12–20)
CALCIUM SERPL-MCNC: 9 MG/DL (ref 8.5–10.1)
CHLORIDE SERPL-SCNC: 111 MMOL/L (ref 100–111)
CHOLEST SERPL-MCNC: 152 MG/DL
CO2 SERPL-SCNC: 27 MMOL/L (ref 21–32)
CREAT SERPL-MCNC: 0.92 MG/DL (ref 0.6–1.3)
CREAT UR-MCNC: 100 MG/DL (ref 30–125)
DIFFERENTIAL METHOD BLD: ABNORMAL
EOSINOPHIL # BLD: 0.2 K/UL (ref 0–0.4)
EOSINOPHIL NFR BLD: 4 % (ref 0–5)
ERYTHROCYTE [DISTWIDTH] IN BLOOD BY AUTOMATED COUNT: 14 % (ref 11.6–14.5)
GLOBULIN SER CALC-MCNC: 3.5 G/DL (ref 2–4)
GLUCOSE SERPL-MCNC: 98 MG/DL (ref 74–99)
HBA1C MFR BLD: 6.6 % (ref 4.2–5.6)
HCT VFR BLD AUTO: 37.5 % (ref 35–45)
HDLC SERPL-MCNC: 66 MG/DL (ref 40–60)
HDLC SERPL: 2.3 {RATIO} (ref 0–5)
HGB BLD-MCNC: 11.9 G/DL (ref 12–16)
LDLC SERPL CALC-MCNC: 68.4 MG/DL (ref 0–100)
LIPID PROFILE,FLP: ABNORMAL
LYMPHOCYTES # BLD: 2.3 K/UL (ref 0.9–3.6)
LYMPHOCYTES NFR BLD: 40 % (ref 21–52)
MCH RBC QN AUTO: 30 PG (ref 24–34)
MCHC RBC AUTO-ENTMCNC: 31.7 G/DL (ref 31–37)
MCV RBC AUTO: 94.5 FL (ref 74–97)
MICROALBUMIN UR-MCNC: 40.5 MG/DL (ref 0–3)
MICROALBUMIN/CREAT UR-RTO: 405 MG/G (ref 0–30)
MONOCYTES # BLD: 0.5 K/UL (ref 0.05–1.2)
MONOCYTES NFR BLD: 9 % (ref 3–10)
NEUTS SEG # BLD: 2.7 K/UL (ref 1.8–8)
NEUTS SEG NFR BLD: 46 % (ref 40–73)
PLATELET # BLD AUTO: 212 K/UL (ref 135–420)
PMV BLD AUTO: 12 FL (ref 9.2–11.8)
POTASSIUM SERPL-SCNC: 4.4 MMOL/L (ref 3.5–5.5)
PROT SERPL-MCNC: 7.2 G/DL (ref 6.4–8.2)
RBC # BLD AUTO: 3.97 M/UL (ref 4.2–5.3)
SODIUM SERPL-SCNC: 140 MMOL/L (ref 136–145)
TRIGL SERPL-MCNC: 88 MG/DL (ref ?–150)
VLDLC SERPL CALC-MCNC: 17.6 MG/DL
WBC # BLD AUTO: 5.8 K/UL (ref 4.6–13.2)

## 2021-08-18 PROCEDURE — 83036 HEMOGLOBIN GLYCOSYLATED A1C: CPT

## 2021-08-18 PROCEDURE — 80053 COMPREHEN METABOLIC PANEL: CPT

## 2021-08-18 PROCEDURE — 80061 LIPID PANEL: CPT

## 2021-08-18 PROCEDURE — 36415 COLL VENOUS BLD VENIPUNCTURE: CPT

## 2021-08-18 PROCEDURE — 82043 UR ALBUMIN QUANTITATIVE: CPT

## 2021-08-18 PROCEDURE — 85025 COMPLETE CBC W/AUTO DIFF WBC: CPT

## 2021-10-27 DIAGNOSIS — E78.5 HYPERLIPIDEMIA, UNSPECIFIED HYPERLIPIDEMIA TYPE: ICD-10-CM

## 2021-10-27 RX ORDER — ATORVASTATIN CALCIUM 20 MG/1
TABLET, FILM COATED ORAL
Qty: 90 TABLET | Refills: 0 | Status: SHIPPED | OUTPATIENT
Start: 2021-10-27 | End: 2022-01-19

## 2021-12-13 ENCOUNTER — OFFICE VISIT (OUTPATIENT)
Dept: CARDIOLOGY CLINIC | Age: 66
End: 2021-12-13
Payer: MEDICARE

## 2021-12-13 VITALS
HEIGHT: 65 IN | SYSTOLIC BLOOD PRESSURE: 151 MMHG | DIASTOLIC BLOOD PRESSURE: 78 MMHG | WEIGHT: 200 LBS | HEART RATE: 54 BPM | BODY MASS INDEX: 33.32 KG/M2

## 2021-12-13 DIAGNOSIS — E78.5 HYPERLIPIDEMIA, UNSPECIFIED HYPERLIPIDEMIA TYPE: ICD-10-CM

## 2021-12-13 DIAGNOSIS — R06.02 SOB (SHORTNESS OF BREATH) ON EXERTION: ICD-10-CM

## 2021-12-13 DIAGNOSIS — I10 ESSENTIAL HYPERTENSION: ICD-10-CM

## 2021-12-13 DIAGNOSIS — R07.9 CHEST PAIN, UNSPECIFIED TYPE: ICD-10-CM

## 2021-12-13 DIAGNOSIS — R55 SYNCOPE, UNSPECIFIED SYNCOPE TYPE: Primary | ICD-10-CM

## 2021-12-13 PROCEDURE — G8753 SYS BP > OR = 140: HCPCS | Performed by: INTERNAL MEDICINE

## 2021-12-13 PROCEDURE — 3017F COLORECTAL CA SCREEN DOC REV: CPT | Performed by: INTERNAL MEDICINE

## 2021-12-13 PROCEDURE — G8417 CALC BMI ABV UP PARAM F/U: HCPCS | Performed by: INTERNAL MEDICINE

## 2021-12-13 PROCEDURE — G8400 PT W/DXA NO RESULTS DOC: HCPCS | Performed by: INTERNAL MEDICINE

## 2021-12-13 PROCEDURE — 1101F PT FALLS ASSESS-DOCD LE1/YR: CPT | Performed by: INTERNAL MEDICINE

## 2021-12-13 PROCEDURE — G8754 DIAS BP LESS 90: HCPCS | Performed by: INTERNAL MEDICINE

## 2021-12-13 PROCEDURE — G8536 NO DOC ELDER MAL SCRN: HCPCS | Performed by: INTERNAL MEDICINE

## 2021-12-13 PROCEDURE — G8510 SCR DEP NEG, NO PLAN REQD: HCPCS | Performed by: INTERNAL MEDICINE

## 2021-12-13 PROCEDURE — G8427 DOCREV CUR MEDS BY ELIG CLIN: HCPCS | Performed by: INTERNAL MEDICINE

## 2021-12-13 PROCEDURE — 1090F PRES/ABSN URINE INCON ASSESS: CPT | Performed by: INTERNAL MEDICINE

## 2021-12-13 PROCEDURE — 99214 OFFICE O/P EST MOD 30 MIN: CPT | Performed by: INTERNAL MEDICINE

## 2021-12-13 RX ORDER — AMLODIPINE BESYLATE 5 MG/1
5 TABLET ORAL DAILY
Qty: 90 TABLET | Refills: 1 | Status: SHIPPED | OUTPATIENT
Start: 2021-12-13 | End: 2022-03-31

## 2021-12-13 RX ORDER — LOSARTAN POTASSIUM AND HYDROCHLOROTHIAZIDE 25; 100 MG/1; MG/1
1 TABLET ORAL DAILY
COMMUNITY

## 2021-12-13 NOTE — PROGRESS NOTES
1. Have you been to the ER, urgent care clinic since your last visit? Hospitalized since your last visit? No    2. Have you seen or consulted any other health care providers outside of the [address] [place] [address] since your last visit? Include any pap smears or colon screening.       No

## 2021-12-13 NOTE — PROGRESS NOTES
HISTORY OF PRESENT ILLNESS  Angelique Kang is a 77 y.o. female. 11/20/2020  Patient presents with episodes of syncope she has been having these episodes on and off for recently worse. Last month when she was sitting in suddenly felt hot nauseous and passed out. She had another episode when she had vomited some blood in subsequently passed out. She has autonomic neuropathy due to diabetes. She also complains of substernal chest pain not related to activity exertion denies any clear radiation. She has increased shortness of breath on exertion. She has episodes of palpitation    Cholesterol Problem  Associated symptoms include chest pain and shortness of breath. Pertinent negatives include no abdominal pain and no headaches. Hypertension  The history is provided by the patient. This is a chronic problem. The problem occurs constantly. Associated symptoms include chest pain and shortness of breath. Pertinent negatives include no abdominal pain and no headaches. Shortness of Breath  The history is provided by the patient. This is a recurrent problem. The problem occurs intermittently. The problem has not changed since onset. Associated symptoms include chest pain. Pertinent negatives include no fever, no headaches, no cough, no sputum production, no hemoptysis, no wheezing, no PND, no orthopnea, no vomiting, no abdominal pain, no rash, no leg swelling and no claudication. Precipitated by: exertion. Follow-up  Associated symptoms include chest pain and shortness of breath. Pertinent negatives include no abdominal pain and no headaches. Review of Systems   Constitutional: Negative for chills and fever. HENT: Negative for nosebleeds. Eyes: Negative for blurred vision and double vision. Respiratory: Positive for shortness of breath. Negative for cough, hemoptysis, sputum production and wheezing. Cardiovascular: Positive for chest pain and palpitations.  Negative for orthopnea, claudication, leg swelling and PND. Gastrointestinal: Negative for abdominal pain, heartburn, nausea and vomiting. Musculoskeletal: Positive for joint pain. Negative for myalgias. Skin: Negative for rash. Neurological: Positive for loss of consciousness. Negative for dizziness, weakness and headaches. Endo/Heme/Allergies: Does not bruise/bleed easily. Family History   Problem Relation Age of Onset    No Known Problems Mother     No Known Problems Father        Past Medical History:   Diagnosis Date    Anemia     Arthritis     Asthma     Chronic pain     chronic back pain    Chronic sinusitis     Diabetes ([address]     Fibromyalgia     GERD (gastroesophageal reflux disease)     Hepatitis B 10 years ago    Unsure if accurate. No tests in system.  Hypertension     Unspecified sleep apnea      No CPAP       Past Surgical History:   Procedure Laterality Date    HX BREAST LUMPECTOMY  2011    right breast    HX  SECTION      HX CHOLECYSTECTOMY      HX GYN      tubal ligation    HX ORTHOPAEDIC      right knee replacement    HX ORTHOPAEDIC Left     carpal tunnel       Social History     Tobacco Use    Smoking status: Never Smoker    Smokeless tobacco: Never Used   Substance Use Topics    Alcohol use: No       Allergies   Allergen Reactions    Latex, Natural Rubber Rash     Latex tape causes rash to area    Celebrex [Celecoxib] Hives    Iodine Hives    Sulfa (Sulfonamide Antibiotics) Hives and Itching       Prior to Admission medications    Medication Sig Start Date End Date Taking? Authorizing Provider   losartan-hydroCHLOROthiazide (HYZAAR) 100-25 mg per tablet Take 1 Tablet by mouth daily. Yes Provider, Historical   amLODIPine (NORVASC) 5 mg tablet Take 1 Tablet by mouth daily. 21  Yes Elida Zendejas MD   atorvastatin (LIPITOR) 20 mg tablet TAKE 1 TABLET BY MOUTH EVERY DAY 10/27/21  Yes Jory Flores NP   ascorbic acid (VITAMIN C PO) Take  by mouth daily.    Yes Provider, Historical ferrous sulfate (IRON PO) Take  by mouth daily. Yes Provider, Historical   cyanocobalamin 1,000 mcg tablet Take 1,000 mcg by mouth daily. Yes Provider, Historical   umeclidinium (Incruse Ellipta) 62.5 mcg/actuation inhaler Take 1 Puff by inhalation daily. Yes Provider, Historical   omeprazole (PRILOSEC) 20 mg capsule Take 20 mg by mouth daily. Yes Provider, Historical   pregabalin (LYRICA) 100 mg capsule Take 1 Cap by mouth three (3) times daily. Max Daily Amount: 300 mg. Indications: FIBROMYALGIA  Patient taking differently: Take 100 mg by mouth two (2) times a day. 2/1/17  Yes Marilyn Ramirez MD   metFORMIN (GLUCOPHAGE) 1,000 mg tablet Take 1,000 mg by mouth two (2) times daily (with meals). 9/24/15  Yes Provider, Historical   cloNIDine (CATAPRES) 0.1 mg tablet Take 0.1 mg by mouth nightly. Yes Provider, Historical   potassium chloride SA (MICRO-K) 10 mEq capsule Take 10 mEq by mouth daily. Yes Provider, Historical   ergocalciferol (VITAMIN D2) 50,000 unit capsule Take 50,000 Units by mouth every seven (7) days. Yes Provider, Historical         Visit Vitals  BP (!) 151/78   Pulse (!) 54   Ht 5' 5\" (1.651 m)   Wt 90.7 kg (200 lb)   BMI 33.28 kg/m²       Physical Exam  Constitutional:       Appearance: She is well-developed. HENT:      Head: Normocephalic and atraumatic. Eyes:      Conjunctiva/sclera: Conjunctivae normal.   Neck:      Thyroid: No thyromegaly. Vascular: No JVD. Trachea: No tracheal deviation. Cardiovascular:      Rate and Rhythm: Normal rate and regular rhythm. Chest Wall: PMI is not displaced. Pulses: No decreased pulses. Heart sounds: Murmur heard. Early systolic murmur is present at the upper right sternal border. No gallop. No S3 sounds. Pulmonary:      Effort: No respiratory distress. Breath sounds: No wheezing or rales. Chest:      Chest wall: No tenderness. Abdominal:      Palpations: Abdomen is soft. Tenderness:  There is no abdominal tenderness. Musculoskeletal:      Cervical back: Neck supple. Skin:     General: Skin is warm. Neurological:      Mental Status: She is alert and oriented to person, place, and time. Ms. Claritza Echeverria has a reminder for a \"due or due soon\" health maintenance. I have asked that she contact her primary care provider for follow-up on this health maintenance. I have personally reviewed patients ekg done at other facility. I Have personally reviewed recent relevant labs available and discussed with patient  cxr,labs  SUMMARY:echo-10/2017  Left ventricle: Systolic function was normal. Ejection fraction was  estimated in the range of 55 % to 60 %. There were no regional wall motion  abnormalities. There was mild concentric hypertrophy. Doppler parameters  were consistent with abnormal left ventricular relaxation (grade 1  diastolic dysfunction). Mitral valve: There was mild annular calcification. There was trivial  regurgitation. Tricuspid valve: There was trivial regurgitation. NUCLEAR IMAGING:10/2017  Findings:   1. Stress images reveal normal Myoview distrubution in all the LV segments in short axis, vertical and horizontal long axis views. 2. Resting images have a normal uptake. 3. Gated images reveal normal wall motion and the ejection fraction is calculated to be 76%. Conclusion:   1. Normal perfusion scan. 2. Normal wall motion and ejection fraction. 3. No evidence of significant fixed or reversible defect suggesting ischemia or myocardial infarction noted from this nuclear study. 4. Low risk scan.     Care Everywhere Result Report  Lipid Complete PanelResulted: 7/16/2020 2:43 PM  [address]  Component Name Value Ref Range   Cholesterol 104 (L) 110 - 200 mg/dL   Triglyceride 68 40 - 149 mg/dL   HDL 46 >=40 mg/dL   Cholesterol/HDL 2.3 0.0 - 5.0    Non-HDL Cholesterol 58 <130 mg/dL   LDL CALCULATION 45 (L) 50 - 99 mg/dL   VLDL CALCULATION 14 8 - 30 mg/dL   LDL/HDL Ratio 1.0       Interpretation Summary 1/2021    · LV: Estimated LVEF is 50 - 55%. Normal cavity size. Mild concentric hypertrophy. Low normal systolic function. Mild hypokinesis of the basal inferior and mid inferior wall(s). Moderate (grade 2) left ventricular diastolic dysfunction. · LA: Left Atrium volume index is 28.89 mL/m2. · MV: Mitral annular calcification. Mild mitral valve regurgitation is present. · PA: Pulmonary arterial systolic pressure is 22 mmHg. Ajit Varela MD Test Supervisor, ECG [facility], SPECT [facility] 1/12/2021       Procedure Conclusion      Nuclear Stress Test    Nuclear Cardiac Spect Rest then Gated Stress study. Jean Pierre Drafts was used as the stressing method and agent. (Jean Pierre Drafts given via a 10 - 20 sec injection.)   One day myocardial perfusion study. Date: 1/12/2021. Left ventricular perfusion is normal. Myocardial perfusion imaging supports a low risk stress test.   There is a prior study available for comparison. .       Assessment           ICD-10-CM ICD-9-CM    1. Syncope, unspecified syncope type  R55 780.2     Stable symptom no recurrence monitor   2. Hyperlipidemia, unspecified hyperlipidemia type  E78.5 272.4     Continue treatment lab with PCP   3. SOB (shortness of breath) on exertion  R06.02 786.05     Exertional multifactorial continue treatment blood pressure control   4. Chest pain, unspecified type  R07.9 786.50     Atypical stable monitor   5. Essential hypertension  I10 401.9     Stable monitor     10/2018 -Stable, SOB with exertion at times. Normal NST and echo 10/2017. B/P elevated, has been under increased stress due to 's illness. To monitor b/p at home. Lipids followed by PCP. 10/2019  Stable shortness of breath exertion and atypical chest pain. Has noticed weight gain continue with diet and exercise. Currently not on any lipid-lowering agent. Check lipids and consider statin if LDL is high  10/2020  Cardiac status stable.   Blood pressure elevated start home monitoring and decide on additional medicine. LDL was high and she is now on statin with good controlled LDL. Continue other treatment  11/2020  Seen for episodes of syncope possible vagal or orthostatic. Has autonomic neuropathy due to diabetes. Also complained of chest pain possible angina and increased shortness of breath. Losartan HCTZ discontinued. I have added losartan and Toprol for blood pressure control check echo and stress test  12/2021  No recurrence of syncope. Blood pressure elevated I have added amlodipine 5 mg a day. Back on losartan HCTZ. Heart rate slow currently on clonidine at bedtime. Not on beta-blocker. Nuclear stress test was negative. Echo normal EF  Medications Discontinued During This Encounter   Medication Reason    diclofenac EC (VOLTAREN) 50 mg EC tablet Not A Current Medication    diclofenac (VOLTAREN) 1 % gel Not A Current Medication    losartan (COZAAR) 50 mg tablet Not A Current Medication    losartan 50 mg tab 100 mg, hydroCHLOROthiazide 25 mg tab 25 mg Not A Current Medication    losartan 50 mg tab 100 mg, hydroCHLOROthiazide 25 mg tab 25 mg DUPLICATE ORDER       Orders Placed This Encounter    amLODIPine (NORVASC) 5 mg tablet     Sig: Take 1 Tablet by mouth daily.      Dispense:  90 Tablet     Refill:  1         Imani Cuevas MD

## 2022-01-19 DIAGNOSIS — E78.5 HYPERLIPIDEMIA, UNSPECIFIED HYPERLIPIDEMIA TYPE: ICD-10-CM

## 2022-01-19 RX ORDER — ATORVASTATIN CALCIUM 20 MG/1
TABLET, FILM COATED ORAL
Qty: 90 TABLET | Refills: 0 | Status: SHIPPED | OUTPATIENT
Start: 2022-01-19 | End: 2022-04-21

## 2022-01-24 RX ORDER — METOPROLOL SUCCINATE 50 MG/1
TABLET, EXTENDED RELEASE ORAL
Qty: 90 TABLET | Refills: 3 | Status: SHIPPED | OUTPATIENT
Start: 2022-01-24

## 2022-03-19 PROBLEM — I10 ESSENTIAL HYPERTENSION: Status: ACTIVE | Noted: 2017-10-23

## 2022-03-19 PROBLEM — Z01.810 PRE-OPERATIVE CARDIOVASCULAR EXAMINATION: Status: ACTIVE | Noted: 2017-10-23

## 2022-03-19 PROBLEM — R06.02 SOB (SHORTNESS OF BREATH) ON EXERTION: Status: ACTIVE | Noted: 2017-10-23

## 2022-03-20 PROBLEM — E11.9 TYPE 2 DIABETES MELLITUS WITHOUT COMPLICATION, WITHOUT LONG-TERM CURRENT USE OF INSULIN (HCC): Status: ACTIVE | Noted: 2017-10-23

## 2022-03-20 PROBLEM — R07.9 CHEST PAIN: Status: ACTIVE | Noted: 2017-10-23

## 2022-03-20 PROBLEM — E78.5 HYPERLIPIDEMIA: Status: ACTIVE | Noted: 2017-10-23

## 2022-03-31 RX ORDER — AMLODIPINE BESYLATE 5 MG/1
TABLET ORAL
Qty: 90 TABLET | Refills: 1 | Status: SHIPPED | OUTPATIENT
Start: 2022-03-31

## 2022-04-21 DIAGNOSIS — E78.5 HYPERLIPIDEMIA, UNSPECIFIED HYPERLIPIDEMIA TYPE: ICD-10-CM

## 2022-04-21 RX ORDER — ATORVASTATIN CALCIUM 20 MG/1
TABLET, FILM COATED ORAL
Qty: 90 TABLET | Refills: 0 | Status: SHIPPED | OUTPATIENT
Start: 2022-04-21 | End: 2022-06-13

## 2022-04-27 ENCOUNTER — TRANSCRIBE ORDER (OUTPATIENT)
Dept: REGISTRATION | Age: 67
End: 2022-04-27

## 2022-04-27 ENCOUNTER — HOSPITAL ENCOUNTER (OUTPATIENT)
Dept: GENERAL RADIOLOGY | Age: 67
Discharge: HOME | End: 2022-04-27
Payer: MEDICARE

## 2022-04-27 DIAGNOSIS — M54.50 LUMBAR PAIN: ICD-10-CM

## 2022-04-27 DIAGNOSIS — G89.29 CHRONIC NECK PAIN: ICD-10-CM

## 2022-04-27 DIAGNOSIS — M54.2 CHRONIC NECK PAIN: ICD-10-CM

## 2022-04-27 DIAGNOSIS — G89.29 CHRONIC NECK PAIN: Primary | ICD-10-CM

## 2022-04-27 DIAGNOSIS — M54.2 CHRONIC NECK PAIN: Primary | ICD-10-CM

## 2022-04-27 PROCEDURE — 72100 X-RAY EXAM L-S SPINE 2/3 VWS: CPT

## 2022-04-27 PROCEDURE — 72040 X-RAY EXAM NECK SPINE 2-3 VW: CPT

## 2022-06-10 DIAGNOSIS — E78.5 HYPERLIPIDEMIA, UNSPECIFIED HYPERLIPIDEMIA TYPE: ICD-10-CM

## 2022-06-13 RX ORDER — ATORVASTATIN CALCIUM 20 MG/1
TABLET, FILM COATED ORAL
Qty: 90 TABLET | Refills: 0 | Status: SHIPPED | OUTPATIENT
Start: 2022-06-13 | End: 2022-09-29

## 2022-08-02 NOTE — TELEPHONE ENCOUNTER
Last Visit: 6/19/19 with MD Lorenzo  Next Appointment: none  Previous Refill Encounter(s): 7/11/21 #180 with 1 refill    Requested Prescriptions     Pending Prescriptions Disp Refills    diclofenac EC (VOLTAREN) 50 mg EC tablet 180 Tablet 1     Sig: Take 1 Tablet by mouth two (2) times daily (with meals). For [facility] in place:   Recommendation Provided To:    Intervention Detail: New Rx: 1, reason: Patient Preference  Gap Closed?:   Intervention Accepted By:   Time Spent (min): 5

## 2022-08-03 RX ORDER — DICLOFENAC SODIUM 50 MG/1
50 TABLET, DELAYED RELEASE ORAL 2 TIMES DAILY WITH MEALS
Qty: 180 TABLET | Refills: 1 | Status: SHIPPED | OUTPATIENT
Start: 2022-08-03

## 2022-09-29 DIAGNOSIS — E78.5 HYPERLIPIDEMIA, UNSPECIFIED HYPERLIPIDEMIA TYPE: ICD-10-CM

## 2022-09-29 RX ORDER — ATORVASTATIN CALCIUM 20 MG/1
TABLET, FILM COATED ORAL
Qty: 90 TABLET | Refills: 0 | Status: SHIPPED | OUTPATIENT
Start: 2022-09-29

## 2023-01-12 ENCOUNTER — OFFICE VISIT (OUTPATIENT)
Dept: CARDIOLOGY CLINIC | Age: 68
End: 2023-01-12
Payer: MEDICARE

## 2023-01-12 VITALS
SYSTOLIC BLOOD PRESSURE: 150 MMHG | HEIGHT: 65 IN | WEIGHT: 177 LBS | DIASTOLIC BLOOD PRESSURE: 73 MMHG | HEART RATE: 60 BPM | OXYGEN SATURATION: 99 % | BODY MASS INDEX: 29.49 KG/M2

## 2023-01-12 DIAGNOSIS — Z01.810 PREOPERATIVE CARDIOVASCULAR EXAMINATION: ICD-10-CM

## 2023-01-12 DIAGNOSIS — R55 SYNCOPE, UNSPECIFIED SYNCOPE TYPE: ICD-10-CM

## 2023-01-12 DIAGNOSIS — I10 ESSENTIAL HYPERTENSION: Primary | ICD-10-CM

## 2023-01-12 DIAGNOSIS — E78.5 HYPERLIPIDEMIA, UNSPECIFIED HYPERLIPIDEMIA TYPE: ICD-10-CM

## 2023-01-12 PROCEDURE — G8536 NO DOC ELDER MAL SCRN: HCPCS | Performed by: INTERNAL MEDICINE

## 2023-01-12 PROCEDURE — 1090F PRES/ABSN URINE INCON ASSESS: CPT | Performed by: INTERNAL MEDICINE

## 2023-01-12 PROCEDURE — 3017F COLORECTAL CA SCREEN DOC REV: CPT | Performed by: INTERNAL MEDICINE

## 2023-01-12 PROCEDURE — 3077F SYST BP >= 140 MM HG: CPT | Performed by: INTERNAL MEDICINE

## 2023-01-12 PROCEDURE — 1123F ACP DISCUSS/DSCN MKR DOCD: CPT | Performed by: INTERNAL MEDICINE

## 2023-01-12 PROCEDURE — G8400 PT W/DXA NO RESULTS DOC: HCPCS | Performed by: INTERNAL MEDICINE

## 2023-01-12 PROCEDURE — 3078F DIAST BP <80 MM HG: CPT | Performed by: INTERNAL MEDICINE

## 2023-01-12 PROCEDURE — G8417 CALC BMI ABV UP PARAM F/U: HCPCS | Performed by: INTERNAL MEDICINE

## 2023-01-12 PROCEDURE — 1101F PT FALLS ASSESS-DOCD LE1/YR: CPT | Performed by: INTERNAL MEDICINE

## 2023-01-12 PROCEDURE — G8510 SCR DEP NEG, NO PLAN REQD: HCPCS | Performed by: INTERNAL MEDICINE

## 2023-01-12 PROCEDURE — G8427 DOCREV CUR MEDS BY ELIG CLIN: HCPCS | Performed by: INTERNAL MEDICINE

## 2023-01-12 PROCEDURE — 99214 OFFICE O/P EST MOD 30 MIN: CPT | Performed by: INTERNAL MEDICINE

## 2023-01-12 RX ORDER — FLUTICASONE PROPIONATE 50 MCG
SPRAY, SUSPENSION (ML) NASAL
COMMUNITY

## 2023-01-12 RX ORDER — ALBUTEROL SULFATE 90 UG/1
AEROSOL, METERED RESPIRATORY (INHALATION)
COMMUNITY

## 2023-01-12 RX ORDER — NETARSUDIL AND LATANOPROST OPHTHALMIC SOLUTION, 0.02%/0.005% .2; .05 MG/ML; MG/ML
SOLUTION/ DROPS OPHTHALMIC; TOPICAL
COMMUNITY

## 2023-01-12 NOTE — PROGRESS NOTES
HISTORY OF PRESENT ILLNESS  Mariama Cortes is a 79 y.o. female. 11/20/2020  Patient presents with episodes of syncope she has been having these episodes on and off for recently worse. Last month when she was sitting in suddenly felt hot nauseous and passed out. She had another episode when she had vomited some blood in subsequently passed out. She has autonomic neuropathy due to diabetes. She also complains of substernal chest pain not related to activity exertion denies any clear radiation. She has increased shortness of breath on exertion. She has episodes of palpitation    Follow-up  Associated symptoms include chest pain and shortness of breath. Pertinent negatives include no abdominal pain and no headaches. Cholesterol Problem  Associated symptoms include chest pain and shortness of breath. Pertinent negatives include no abdominal pain and no headaches. Hypertension  The history is provided by the Patient. This is a chronic problem. The problem occurs constantly. Associated symptoms include chest pain and shortness of breath. Pertinent negatives include no abdominal pain and no headaches. Shortness of Breath  The history is provided by the Patient. This is a recurrent problem. The problem occurs intermittently. The problem has not changed since onset. Associated symptoms include chest pain. Pertinent negatives include no fever, no headaches, no cough, no sputum production, no hemoptysis, no wheezing, no PND, no orthopnea, no vomiting, no abdominal pain, no rash, no leg swelling and no claudication. Precipitated by: exertion. Review of Systems   Constitutional:  Negative for chills and fever. HENT:  Negative for nosebleeds. Eyes:  Negative for blurred vision and double vision. Respiratory:  Positive for shortness of breath. Negative for cough, hemoptysis, sputum production and wheezing. Cardiovascular:  Positive for chest pain and palpitations.  Negative for orthopnea, claudication, leg swelling and PND. Gastrointestinal:  Negative for abdominal pain, heartburn, nausea and vomiting. Musculoskeletal:  Positive for joint pain. Negative for myalgias. Skin:  Negative for rash. Neurological:  Positive for loss of consciousness. Negative for dizziness, weakness and headaches. Endo/Heme/Allergies:  Does not bruise/bleed easily. Family History   Problem Relation Age of Onset    No Known Problems Mother     No Known Problems Father        Past Medical History:   Diagnosis Date    Anemia     Arthritis     Asthma     Chronic pain     chronic back pain    Chronic sinusitis     Diabetes (HCC)     Fibromyalgia     GERD (gastroesophageal reflux disease)     Hepatitis B 10 years ago    Unsure if accurate. No tests in system. Hypertension     Unspecified sleep apnea      No CPAP       Past Surgical History:   Procedure Laterality Date    HX BREAST LUMPECTOMY  2011    right breast    HX  SECTION      HX CHOLECYSTECTOMY      HX GYN      tubal ligation    HX ORTHOPAEDIC      right knee replacement    HX ORTHOPAEDIC Left     carpal tunnel       Social History     Tobacco Use    Smoking status: Never    Smokeless tobacco: Never   Substance Use Topics    Alcohol use: No       Allergies   Allergen Reactions    Latex, Natural Rubber Rash     Latex tape causes rash to area    Celebrex [Celecoxib] Hives    Iodine Hives    Sulfa (Sulfonamide Antibiotics) Hives and Itching       Prior to Admission medications    Medication Sig Start Date End Date Taking?  Authorizing Provider   fluticasone propionate (FLONASE) 50 mcg/actuation nasal spray fluticasone propionate 50 mcg/actuation nasal spray,suspension   SPRAY 2 SPRAYS INTO EACH NOSTRIL EVERY DAY FOR 30 DAYS   Yes Provider, Historical   albuterol (PROVENTIL HFA, VENTOLIN HFA, PROAIR HFA) 90 mcg/actuation inhaler albuterol sulfate HFA 90 mcg/actuation aerosol inhaler   1 - 2 PUFF AS NEEDED INHALATION EVERY 4 HRS   Yes Provider, Historical   tiotropium (SPIRIVA) 18 mcg inhalation capsule Spiriva with HandiHaler 18 mcg and inhalation capsules   Yes Provider, Historical   netarsudiL-latanoprost (Rocklatan) 0.02-0.005 % drop Rocklatan 0.02 %-0.005 % eye drops   Yes Provider, Historical   atorvastatin (LIPITOR) 20 mg tablet TAKE 1 TABLET BY MOUTH EVERY DAY 9/29/22  Yes Jory Flores NP   diclofenac EC (VOLTAREN) 50 mg EC tablet Take 1 Tablet by mouth two (2) times daily (with meals). 8/3/22  Yes Anjum Fernando MD   amLODIPine (NORVASC) 5 mg tablet TAKE 1 TABLET BY MOUTH EVERY DAY 3/31/22  Yes Jory Flores NP   metoprolol succinate (TOPROL-XL) 50 mg XL tablet TAKE 1 TABLET BY MOUTH EVERY DAY 1/24/22  Yes Francesca Vance MD   losartan-hydroCHLOROthiazide [facility]) 100-25 mg per tablet Take 1 Tablet by mouth daily. Yes Provider, Historical   ascorbic acid (VITAMIN C PO) Take  by mouth daily. Yes Provider, Historical   ferrous sulfate (IRON PO) Take  by mouth daily. Yes Provider, Historical   cyanocobalamin 1,000 mcg tablet Take 1,000 mcg by mouth daily. Yes Provider, Historical   umeclidinium (Incruse Ellipta) 62.5 mcg/actuation inhaler Take 1 Puff by inhalation daily. Yes Provider, Historical   omeprazole (PRILOSEC) 20 mg capsule Take 20 mg by mouth daily. Yes Provider, Historical   pregabalin (LYRICA) 100 mg capsule Take 1 Cap by mouth three (3) times daily. Max Daily Amount: 300 mg. Indications: FIBROMYALGIA  Patient taking differently: Take 100 mg by mouth two (2) times a day. Indications: disorder characterized by stiff, tender & painful muscles 2/1/17  Yes Jacob Tristan MD   metFORMIN (GLUCOPHAGE) 1,000 mg tablet Take 1,000 mg by mouth two (2) times daily (with meals). 9/24/15  Yes Provider, Historical   cloNIDine (CATAPRES) 0.1 mg tablet Take 0.1 mg by mouth nightly. Yes Provider, Historical   potassium chloride SA (MICRO-K) 10 mEq capsule Take 10 mEq by mouth daily.      Yes Provider, Historical   ergocalciferol (ERGOCALCIFEROL) 1,250 mcg (50,000 unit) capsule Take 50,000 Units by mouth every seven (7) days. Yes Provider, Historical         Visit Vitals  BP (!) 150/73 (BP 1 Location: Left upper arm, BP Patient Position: Sitting, BP Cuff Size: Adult)   Pulse 60   Ht 5' 5\" (1.651 m)   Wt 80.3 kg (177 lb)   SpO2 99%   BMI 29.45 kg/m²       Physical Exam  Constitutional:       Appearance: She is well-developed. HENT:      Head: Normocephalic and atraumatic. Eyes:      Conjunctiva/sclera: Conjunctivae normal.   Neck:      Thyroid: No thyromegaly. Vascular: No JVD. Trachea: No tracheal deviation. Cardiovascular:      Rate and Rhythm: Normal rate and regular rhythm. Chest Wall: PMI is not displaced. Pulses: No decreased pulses. Heart sounds: Murmur heard. Early systolic murmur is present at the upper right sternal border. No gallop. No S3 sounds. Pulmonary:      Effort: No respiratory distress. Breath sounds: No wheezing or rales. Chest:      Chest wall: No tenderness. Abdominal:      Palpations: Abdomen is soft. Tenderness: There is no abdominal tenderness. Musculoskeletal:      Cervical back: Neck supple. Skin:     General: Skin is warm. Neurological:      Mental Status: She is alert and oriented to person, place, and time. Ms. Tate Olivas has a reminder for a \"due or due soon\" health maintenance. I have asked that she contact her primary care provider for follow-up on this health maintenance. I have personally reviewed patients ekg done at other facility. I Have personally reviewed recent relevant labs available and discussed with patient  cxr,labs  SUMMARY:echo-10/2017  Left ventricle: Systolic function was normal. Ejection fraction was  estimated in the range of 55 % to 60 %. There were no regional wall motion  abnormalities. There was mild concentric hypertrophy. Doppler parameters  were consistent with abnormal left ventricular relaxation (grade 1  diastolic dysfunction).     Mitral valve: There was mild annular calcification. There was trivial  regurgitation. Tricuspid valve: There was trivial regurgitation. NUCLEAR IMAGING:10/2017  Findings:   1. Stress images reveal normal Myoview distrubution in all the LV segments in short axis, vertical and horizontal long axis views. 2. Resting images have a normal uptake. 3. Gated images reveal normal wall motion and the ejection fraction is calculated to be 76%. Conclusion:   1. Normal perfusion scan. 2. Normal wall motion and ejection fraction. 3. No evidence of significant fixed or reversible defect suggesting ischemia or myocardial infarction noted from this nuclear study. 4. Low risk scan. Care Everywhere Result Report  Lipid Complete PanelResulted: 7/16/2020 2:43 PM  [address]  Component Name Value Ref Range   Cholesterol 104 (L) 110 - 200 mg/dL   Triglyceride 68 40 - 149 mg/dL   HDL 46 >=40 mg/dL   Cholesterol/HDL 2.3 0.0 - 5.0    Non-HDL Cholesterol 58 <130 mg/dL   LDL CALCULATION 45 (L) 50 - 99 mg/dL   VLDL CALCULATION 14 8 - 30 mg/dL   LDL/HDL Ratio 1.0       Interpretation Summary 1/2021    LV: Estimated LVEF is 50 - 55%. Normal cavity size. Mild concentric hypertrophy. Low normal systolic function. Mild hypokinesis of the basal inferior and mid inferior wall(s). Moderate (grade 2) left ventricular diastolic dysfunction. LA: Left Atrium volume index is 28.89 mL/m2. MV: Mitral annular calcification. Mild mitral valve regurgitation is present. PA: Pulmonary arterial systolic pressure is 22 mmHg. Shirlene Morales MD Test Supervisor, ECG [place], [facility] [place] 1/12/2021       Procedure Conclusion      Nuclear Stress Test    Nuclear Cardiac Spect Rest then Gated Stress study. Lubertha Johnston was used as the stressing method and agent. (Lubertha Johnston given via a 10 - 20 sec injection.)   One day myocardial perfusion study. Date: 1/12/2021.    Left ventricular perfusion is normal. Myocardial perfusion imaging supports a low risk stress test.   There is a prior study available for comparison. .     Impression  - OTHERWISE NORMAL ECG -    Impression  SR-Sinus rhythm-normal P axis, V-rate 50-99    Resulting Agency  [facility]   Specimen Collected: 01/10/23 09:30 Last Resulted: 01/11/23 18:54     Assessment           ICD-10-CM ICD-9-CM    1. Essential hypertension  I10 401.9     Elevated. We will continue to monitor. Continue treatment      2. Hyperlipidemia, unspecified hyperlipidemia type  E78.5 272.4     Continue current treatment with PCP      3. Syncope, unspecified syncope type  R55 780.2     No recurrence stable monitor      4. Preoperative cardiovascular examination  Z01.810 V72.81     Stable cardiac status. Okay to proceed with knee surgery medium cardiac risk        10/2018 -Stable, SOB with exertion at times. Normal NST and echo 10/2017. B/P elevated, has been under increased stress due to 's illness. To monitor b/p at home. Lipids followed by PCP. 10/2019  Stable shortness of breath exertion and atypical chest pain. Has noticed weight gain continue with diet and exercise. Currently not on any lipid-lowering agent. Check lipids and consider statin if LDL is high  10/2020  Cardiac status stable. Blood pressure elevated start home monitoring and decide on additional medicine. LDL was high and she is now on statin with good controlled LDL. Continue other treatment  11/2020  Seen for episodes of syncope possible vagal or orthostatic. Has autonomic neuropathy due to diabetes. Also complained of chest pain possible angina and increased shortness of breath. Losartan HCTZ discontinued. I have added losartan and Toprol for blood pressure control check echo and stress test  12/2021  No recurrence of syncope. Blood pressure elevated I have added amlodipine 5 mg a day. Back on losartan HCTZ. Heart rate slow currently on clonidine at bedtime. Not on beta-blocker. Nuclear stress test was negative.   Echo normal EF  1/2023  Stable cardiac status continue current medical management. Blood pressure elevated. Continue to monitor. Okay to proceed with knee surgery medium cardiac risk  There are no discontinued medications. No orders of the defined types were placed in this encounter.         Earl Herrera MD

## 2023-01-12 NOTE — PROGRESS NOTES
1. Have you been to the ER, urgent care clinic since your last visit? Hospitalized since your last visit? No    2. Have you seen or consulted any other health care providers outside of the [address] [place] [address] since your last visit? Include any pap smears or colon screening.  No

## 2023-01-27 RX ORDER — AMLODIPINE BESYLATE 5 MG/1
TABLET ORAL
Qty: 90 TABLET | Refills: 1 | Status: SHIPPED | OUTPATIENT
Start: 2023-01-27

## 2023-02-24 RX ORDER — ATORVASTATIN CALCIUM 20 MG/1
TABLET, FILM COATED ORAL
Qty: 90 TABLET | Refills: 1 | Status: SHIPPED | OUTPATIENT
Start: 2023-02-24

## 2023-02-27 RX ORDER — METOPROLOL SUCCINATE 50 MG/1
TABLET, EXTENDED RELEASE ORAL
Qty: 90 TABLET | Refills: 3 | Status: SHIPPED | OUTPATIENT
Start: 2023-02-27

## 2023-06-21 RX ORDER — AMLODIPINE BESYLATE 5 MG/1
TABLET ORAL
Qty: 90 TABLET | Refills: 1 | Status: SHIPPED | OUTPATIENT
Start: 2023-06-21

## 2024-01-11 ENCOUNTER — OFFICE VISIT (OUTPATIENT)
Age: 69
End: 2024-01-11
Payer: COMMERCIAL

## 2024-01-11 VITALS
WEIGHT: 184 LBS | SYSTOLIC BLOOD PRESSURE: 142 MMHG | HEART RATE: 63 BPM | OXYGEN SATURATION: 98 % | DIASTOLIC BLOOD PRESSURE: 73 MMHG | HEIGHT: 65 IN | BODY MASS INDEX: 30.66 KG/M2

## 2024-01-11 DIAGNOSIS — I34.0 NONRHEUMATIC MITRAL VALVE REGURGITATION: ICD-10-CM

## 2024-01-11 DIAGNOSIS — R55 SYNCOPE AND COLLAPSE: ICD-10-CM

## 2024-01-11 DIAGNOSIS — I10 ESSENTIAL (PRIMARY) HYPERTENSION: Primary | ICD-10-CM

## 2024-01-11 DIAGNOSIS — E78.5 HYPERLIPIDEMIA, UNSPECIFIED HYPERLIPIDEMIA TYPE: ICD-10-CM

## 2024-01-11 PROCEDURE — 3078F DIAST BP <80 MM HG: CPT | Performed by: INTERNAL MEDICINE

## 2024-01-11 PROCEDURE — 99214 OFFICE O/P EST MOD 30 MIN: CPT | Performed by: INTERNAL MEDICINE

## 2024-01-11 PROCEDURE — 3077F SYST BP >= 140 MM HG: CPT | Performed by: INTERNAL MEDICINE

## 2024-01-11 PROCEDURE — 1123F ACP DISCUSS/DSCN MKR DOCD: CPT | Performed by: INTERNAL MEDICINE

## 2024-01-11 ASSESSMENT — PATIENT HEALTH QUESTIONNAIRE - PHQ9
SUM OF ALL RESPONSES TO PHQ QUESTIONS 1-9: 0
2. FEELING DOWN, DEPRESSED OR HOPELESS: 0
SUM OF ALL RESPONSES TO PHQ QUESTIONS 1-9: 0
1. LITTLE INTEREST OR PLEASURE IN DOING THINGS: 0
SUM OF ALL RESPONSES TO PHQ9 QUESTIONS 1 & 2: 0
DEPRESSION UNABLE TO ASSESS: FUNCTIONAL CAPACITY MOTIVATION LIMITS ACCURACY

## 2024-01-11 NOTE — PROGRESS NOTES
1. Have you been to the ER, urgent care clinic since your last visit?  Hospitalized since your last visit?     Yes, [facility]    2. Have you seen or consulted any other health care providers outside of the [facility] since your last visit?  Include any pap smears or colon screening.      No      
capsule Take 1 capsule by mouth every 7 days   Yes Automatic Reconciliation, Ar   losartan-hydroCHLOROthiazide (HYZAAR) 100-25 MG per tablet Take 1 tablet by mouth daily   Yes Automatic Reconciliation, Ar   metFORMIN (GLUCOPHAGE) 1000 MG tablet Take 1 tablet by mouth 2 times daily (with meals) 9/24/15  Yes Automatic Reconciliation, Ar   omeprazole (PRILOSEC) 20 MG delayed release capsule Take 1 capsule by mouth daily   Yes Automatic Reconciliation, Ar   potassium chloride (MICRO-K) 10 MEQ extended release capsule Take 1 capsule by mouth daily   Yes Automatic Reconciliation, Ar   pregabalin (LYRICA) 100 MG capsule Take 1 capsule by mouth 3 times daily. 2/1/17  Yes Automatic Reconciliation, Ar   umeclidinium bromide (INCRUSE ELLIPTA) 62.5 MCG/ACT inhaler Inhale 1 puff into the lungs daily   Yes Automatic Reconciliation, Ar   metoprolol succinate (TOPROL XL) 50 MG extended release tablet TAKE 1 TABLET BY MOUTH EVERY DAY 2/27/23  Yes Karly Evans APRN - NP   atorvastatin (LIPITOR) 20 MG tablet TAKE 1 TABLET BY MOUTH EVERY DAY 2/24/23  Yes Karly Evans APRN - NP         BP (!) 142/73 (Site: Left Upper Arm, Position: Sitting, Cuff Size: Large Adult)   Pulse 63   Ht 1.651 m (5' 5\")   Wt 83.5 kg (184 lb)   SpO2 98%   BMI 30.62 kg/m²   Physical Exam  Constitutional:       Appearance: She is well-developed.   HENT:      Head: Normocephalic and atraumatic.   Eyes:      Conjunctiva/sclera: Conjunctivae normal.   Neck:      Thyroid: No thyromegaly.      Vascular: No JVD.      Trachea: No tracheal deviation.   Cardiovascular:      Rate and Rhythm: Normal rate and regular rhythm.      Chest Wall: PMI is not displaced.      Pulses: No decreased pulses.      Heart sounds: Murmur heard.   Early systolic murmur is present at the upper right sternal border.     No gallop. No S3 sounds.   Pulmonary:      Effort: No respiratory distress.      Breath sounds: No wheezing or rales.   Chest:      Chest wall: No tenderness.

## 2024-03-29 RX ORDER — METOPROLOL SUCCINATE 50 MG/1
TABLET, EXTENDED RELEASE ORAL
Qty: 90 TABLET | Refills: 3 | Status: SHIPPED | OUTPATIENT
Start: 2024-03-29

## 2024-09-18 ENCOUNTER — HOSPITAL ENCOUNTER (OUTPATIENT)
Facility: HOSPITAL | Age: 69
Discharge: HOME | End: 2024-09-21

## 2024-09-18 LAB — LABCORP SPECIMEN COLLECTION: NORMAL

## 2024-09-18 PROCEDURE — 99001 SPECIMEN HANDLING PT-LAB: CPT

## 2024-11-05 ENCOUNTER — HOSPITAL ENCOUNTER (OUTPATIENT)
Facility: HOSPITAL | Age: 69
Discharge: HOME | End: 2024-11-08
Payer: MEDICARE

## 2024-11-05 VITALS — BODY MASS INDEX: 30.82 KG/M2 | HEIGHT: 65 IN | WEIGHT: 185 LBS

## 2024-11-05 DIAGNOSIS — Z12.31 VISIT FOR SCREENING MAMMOGRAM: ICD-10-CM

## 2024-11-05 PROCEDURE — 77063 BREAST TOMOSYNTHESIS BI: CPT

## 2024-11-08 ENCOUNTER — OFFICE VISIT (OUTPATIENT)
Age: 69
End: 2024-11-08

## 2024-11-08 VITALS
HEIGHT: 65 IN | WEIGHT: 185 LBS | HEART RATE: 61 BPM | BODY MASS INDEX: 30.82 KG/M2 | TEMPERATURE: 96.9 F | OXYGEN SATURATION: 97 %

## 2024-11-08 DIAGNOSIS — G89.29 CHRONIC BILATERAL LOW BACK PAIN WITH BILATERAL SCIATICA: ICD-10-CM

## 2024-11-08 DIAGNOSIS — M54.41 CHRONIC BILATERAL LOW BACK PAIN WITH BILATERAL SCIATICA: ICD-10-CM

## 2024-11-08 DIAGNOSIS — M51.362 DEGENERATION OF INTERVERTEBRAL DISC OF LUMBAR REGION WITH DISCOGENIC BACK PAIN AND LOWER EXTREMITY PAIN: ICD-10-CM

## 2024-11-08 DIAGNOSIS — M54.2 NECK PAIN: Primary | ICD-10-CM

## 2024-11-08 DIAGNOSIS — M54.42 CHRONIC BILATERAL LOW BACK PAIN WITH BILATERAL SCIATICA: ICD-10-CM

## 2024-11-08 DIAGNOSIS — M47.816 LUMBAR SPONDYLOSIS: ICD-10-CM

## 2024-11-08 DIAGNOSIS — M47.812 CERVICAL SPONDYLOSIS: ICD-10-CM

## 2024-11-08 RX ORDER — ALBUTEROL SULFATE 90 UG/1
2 INHALANT RESPIRATORY (INHALATION) EVERY 6 HOURS PRN
COMMUNITY

## 2024-11-08 RX ORDER — TIOTROPIUM BROMIDE 18 UG/1
18 CAPSULE ORAL; RESPIRATORY (INHALATION) 2 TIMES DAILY
COMMUNITY

## 2024-11-08 RX ORDER — METHYLPREDNISOLONE 4 MG/1
TABLET ORAL
Qty: 1 KIT | Refills: 0 | Status: SHIPPED | OUTPATIENT
Start: 2024-11-08 | End: 2024-11-14

## 2024-11-08 NOTE — PROGRESS NOTES
Chief complaint   Chief Complaint   Patient presents with    Neck Pain     Left side    Shoulder Pain     left    Arm Pain     left    Lower Back Pain    Leg Pain     Bilateral         History of Present Illness:  Emperatriz Huerta is a  69 y.o.  female who comes in today as new patient referred by her PCP.  She states she has neck pain on the left side that radiates down into her hand and in her subscapular area.  She states is been going on for 40 years.  It started after she was hit with a forklift while working at [employer].  She states this is no longer a Worker's Comp. case.  She has not worked in the last 15 years and states she is on Social Security disability.  She describes the pain as sharp stabbing and burning and she feels swelling in the trapezius area.  She gets numbness in her hand.  She states the arm symptoms have gotten worse over the last year.  She is left-handed.  She states she has issues with her balance, dropping things and dexterity but her handwriting is okay.  She also complains of low back pain that started with the same incident 40 years ago and it radiates into her bilateral buttocks down to her feet.  She gets numbness in her feet.  She states the pain is sharp and stabbing also.  She has tried Tylenol and ibuprofen but cannot really take the ibuprofen because she is allergic to NSAIDs and they cause her to get hives and itch after a while.  She is on Lyrica 100 mg 3 times a day for fibromyalgia.  She states her neck pain and arm pain are worse than the back and leg pain.  She rates her pain as a 6 out of 10 today.  She is diabetic and states her blood sugar runs between 98 and 124 when she checks it.  She is a non-smoker.  She denies fever bowel bladder dysfunction.      Past Medical History:    Hypertension, hyperlipidemia, anemia, asthma, diabetes, fibromyalgia, GERD, sleep apnea -does not use CPAP    Past Surgical History:    Bilateral total knee replacements, right breast biopsy, left

## 2024-11-08 NOTE — PROGRESS NOTES
Emperatrizzulema Huerta presents today for   Chief Complaint   Patient presents with    Neck Pain     Left side    Shoulder Pain     left    Arm Pain     left    Lower Back Pain    Leg Pain     Bilateral         Is someone accompanying this pt? no    Is the patient using any DME equipment during OV? no    Coordination of Care:  1. Have you been to the ER, urgent care clinic since your last visit? no  Hospitalized since your last visit? no    2. Have you seen or consulted any other health care providers outside of the [facility] since your last visit? no Include any pap smears or colon screening. Yes, mammogram

## 2025-01-03 ENCOUNTER — OFFICE VISIT (OUTPATIENT)
Age: 70
End: 2025-01-03

## 2025-01-03 VITALS
HEART RATE: 70 BPM | OXYGEN SATURATION: 96 % | WEIGHT: 178 LBS | DIASTOLIC BLOOD PRESSURE: 74 MMHG | HEIGHT: 65 IN | SYSTOLIC BLOOD PRESSURE: 144 MMHG | BODY MASS INDEX: 29.66 KG/M2

## 2025-01-03 DIAGNOSIS — I34.0 NONRHEUMATIC MITRAL VALVE REGURGITATION: Primary | ICD-10-CM

## 2025-01-03 DIAGNOSIS — I10 ESSENTIAL (PRIMARY) HYPERTENSION: ICD-10-CM

## 2025-01-03 DIAGNOSIS — R01.1 HEART MURMUR: ICD-10-CM

## 2025-01-03 DIAGNOSIS — E78.5 HYPERLIPIDEMIA, UNSPECIFIED HYPERLIPIDEMIA TYPE: ICD-10-CM

## 2025-01-03 DIAGNOSIS — R55 SYNCOPE AND COLLAPSE: ICD-10-CM

## 2025-01-03 RX ORDER — AMLODIPINE BESYLATE 5 MG/1
5 TABLET ORAL DAILY
Qty: 90 TABLET | Refills: 3 | Status: SHIPPED | OUTPATIENT
Start: 2025-01-03

## 2025-01-03 RX ORDER — ATORVASTATIN CALCIUM 20 MG/1
20 TABLET, FILM COATED ORAL DAILY
Qty: 90 TABLET | Refills: 3 | Status: SHIPPED | OUTPATIENT
Start: 2025-01-03

## 2025-01-03 RX ORDER — METOPROLOL SUCCINATE 50 MG/1
50 TABLET, EXTENDED RELEASE ORAL DAILY
Qty: 90 TABLET | Refills: 3 | Status: SHIPPED | OUTPATIENT
Start: 2025-01-03

## 2025-01-03 RX ORDER — LOSARTAN POTASSIUM AND HYDROCHLOROTHIAZIDE 25; 100 MG/1; MG/1
1 TABLET ORAL DAILY
Qty: 90 TABLET | Refills: 3 | Status: SHIPPED | OUTPATIENT
Start: 2025-01-03

## 2025-01-03 NOTE — PROGRESS NOTES
HISTORY OF PRESENT ILLNESS  Emperatriz Huerta is a 67 y.o. female.    11/20/2020  Patient presents with episodes of syncope she has been having these episodes on and off for recently worse.  Last month when she was sitting in suddenly felt hot nauseous and passed out.  She had another episode when she had vomited some blood in subsequently passed out.  She has autonomic neuropathy due to diabetes.  She also complains of substernal chest pain not related to activity exertion denies any clear radiation.  She has increased shortness of breath on exertion.  She has episodes of palpitation  1/3/2025  Seen in f/u.  Denies further syncopal episodes.  Has been out of Hyzaar for 1 month  .  Denies chest pain, shortness of breath or palpitations. Has mild intermittent edema.     Follow-up  Associated symptoms include chest pain and shortness of breath. Pertinent negatives include no abdominal pain and no headaches.   Cholesterol Problem  Associated symptoms include chest pain and shortness of breath. Pertinent negatives include no abdominal pain and no headaches.   Hypertension  The history is provided by the Patient. This is a chronic problem. The problem occurs constantly. Associated symptoms include chest pain and shortness of breath. Pertinent negatives include no abdominal pain and no headaches.   Shortness of Breath  The history is provided by the Patient. This is a recurrent problem. The problem occurs intermittently.The problem has not changed since onset.Associated symptoms include chest pain. Pertinent negatives include no fever, no headaches, no cough, no sputum production, no hemoptysis, no wheezing, no PND, no orthopnea, no vomiting, no abdominal pain, no rash, no leg swelling and no claudication. Precipitated by: exertion.     Review of Systems   Constitutional:  Negative for chills and fever.   HENT:  Negative for nosebleeds.    Eyes:  Negative for blurred vision and double vision.   Respiratory: Negative for

## 2025-01-03 NOTE — PROGRESS NOTES
1. Have you been to the ER, urgent care clinic since your last visit?  Hospitalized since your last visit?     Yes [facility] for knee surgery    2. Have you seen or consulted any other health care providers outside of the [facility] since your last visit?  Include any pap smears or colon screening.      Yes pcp

## 2025-03-31 ENCOUNTER — OFFICE VISIT (OUTPATIENT)
Age: 70
End: 2025-03-31
Payer: MEDICARE

## 2025-03-31 VITALS
SYSTOLIC BLOOD PRESSURE: 139 MMHG | WEIGHT: 177 LBS | DIASTOLIC BLOOD PRESSURE: 67 MMHG | BODY MASS INDEX: 29.49 KG/M2 | HEART RATE: 66 BPM | HEIGHT: 65 IN | OXYGEN SATURATION: 100 %

## 2025-03-31 DIAGNOSIS — R01.1 HEART MURMUR: ICD-10-CM

## 2025-03-31 DIAGNOSIS — E78.5 HYPERLIPIDEMIA, UNSPECIFIED HYPERLIPIDEMIA TYPE: ICD-10-CM

## 2025-03-31 DIAGNOSIS — I34.0 NONRHEUMATIC MITRAL VALVE REGURGITATION: ICD-10-CM

## 2025-03-31 DIAGNOSIS — I10 ESSENTIAL (PRIMARY) HYPERTENSION: Primary | ICD-10-CM

## 2025-03-31 PROCEDURE — G8417 CALC BMI ABV UP PARAM F/U: HCPCS | Performed by: NURSE PRACTITIONER

## 2025-03-31 PROCEDURE — 3078F DIAST BP <80 MM HG: CPT | Performed by: NURSE PRACTITIONER

## 2025-03-31 PROCEDURE — 1160F RVW MEDS BY RX/DR IN RCRD: CPT | Performed by: NURSE PRACTITIONER

## 2025-03-31 PROCEDURE — 3075F SYST BP GE 130 - 139MM HG: CPT | Performed by: NURSE PRACTITIONER

## 2025-03-31 PROCEDURE — 93000 ELECTROCARDIOGRAM COMPLETE: CPT | Performed by: NURSE PRACTITIONER

## 2025-03-31 PROCEDURE — 1126F AMNT PAIN NOTED NONE PRSNT: CPT | Performed by: NURSE PRACTITIONER

## 2025-03-31 PROCEDURE — 1090F PRES/ABSN URINE INCON ASSESS: CPT | Performed by: NURSE PRACTITIONER

## 2025-03-31 PROCEDURE — 1123F ACP DISCUSS/DSCN MKR DOCD: CPT | Performed by: NURSE PRACTITIONER

## 2025-03-31 PROCEDURE — G8400 PT W/DXA NO RESULTS DOC: HCPCS | Performed by: NURSE PRACTITIONER

## 2025-03-31 PROCEDURE — G8427 DOCREV CUR MEDS BY ELIG CLIN: HCPCS | Performed by: NURSE PRACTITIONER

## 2025-03-31 PROCEDURE — 1159F MED LIST DOCD IN RCRD: CPT | Performed by: NURSE PRACTITIONER

## 2025-03-31 PROCEDURE — 1036F TOBACCO NON-USER: CPT | Performed by: NURSE PRACTITIONER

## 2025-03-31 PROCEDURE — 99214 OFFICE O/P EST MOD 30 MIN: CPT | Performed by: NURSE PRACTITIONER

## 2025-03-31 PROCEDURE — 3017F COLORECTAL CA SCREEN DOC REV: CPT | Performed by: NURSE PRACTITIONER

## 2025-03-31 ASSESSMENT — PATIENT HEALTH QUESTIONNAIRE - PHQ9
SUM OF ALL RESPONSES TO PHQ QUESTIONS 1-9: 0
SUM OF ALL RESPONSES TO PHQ QUESTIONS 1-9: 0
2. FEELING DOWN, DEPRESSED OR HOPELESS: NOT AT ALL
SUM OF ALL RESPONSES TO PHQ QUESTIONS 1-9: 0
1. LITTLE INTEREST OR PLEASURE IN DOING THINGS: NOT AT ALL
SUM OF ALL RESPONSES TO PHQ QUESTIONS 1-9: 0

## 2025-03-31 NOTE — PROGRESS NOTES
HISTORY OF PRESENT ILLNESS  Emperatriz Huerta is a 67 y.o. female.    11/20/2020  Patient presents with episodes of syncope she has been having these episodes on and off for recently worse.  Last month when she was sitting in suddenly felt hot nauseous and passed out.  She had another episode when she had vomited some blood in subsequently passed out.  She has autonomic neuropathy due to diabetes.  She also complains of substernal chest pain not related to activity exertion denies any clear radiation.  She has increased shortness of breath on exertion.  She has episodes of palpitation  1/3/2025  Seen in f/u.  Denies further syncopal episodes.  Has been out of Hyzaar for 1 month  .  Denies chest pain, shortness of breath or palpitations. Has mild intermittent edema.   3/31/2025  Seen in f/u.  Denies chest pain, shortness of breath or palpitations.  Denies dizziness or syncope.  Has mild LLE edema  intermittent.    Follow-up  Associated symptoms include chest pain and shortness of breath. Pertinent negatives include no abdominal pain and no headaches.   Cholesterol Problem  Associated symptoms include chest pain and shortness of breath. Pertinent negatives include no abdominal pain and no headaches.   Hypertension  The history is provided by the Patient. This is a chronic problem. The problem occurs constantly. Associated symptoms include chest pain and shortness of breath. Pertinent negatives include no abdominal pain and no headaches.   Shortness of Breath  The history is provided by the Patient. This is a recurrent problem. The problem occurs intermittently.The problem has not changed since onset.Associated symptoms include chest pain. Pertinent negatives include no fever, no headaches, no cough, no sputum production, no hemoptysis, no wheezing, no PND, no orthopnea, no vomiting, no abdominal pain, no rash, no leg swelling and no claudication. Precipitated by: exertion.     Review of Systems   Constitutional:  Negative

## 2025-03-31 NOTE — PROGRESS NOTES
1. Have you been to the ER, urgent care clinic since your last visit?  Hospitalized since your last visit?     No    2. Have you seen or consulted any other health care providers outside of the [facility] since your last visit?  Include any pap smears or colon screening.  No          Previously Negative (within the last year)

## 2025-05-22 ENCOUNTER — OFFICE VISIT (OUTPATIENT)
Age: 70
End: 2025-05-22
Payer: MEDICARE

## 2025-05-22 VITALS
HEART RATE: 58 BPM | BODY MASS INDEX: 29.16 KG/M2 | WEIGHT: 175 LBS | HEIGHT: 65 IN | OXYGEN SATURATION: 100 % | TEMPERATURE: 98 F

## 2025-05-22 DIAGNOSIS — G89.29 CHRONIC BILATERAL LOW BACK PAIN WITH BILATERAL SCIATICA: ICD-10-CM

## 2025-05-22 DIAGNOSIS — M54.41 CHRONIC BILATERAL LOW BACK PAIN WITH BILATERAL SCIATICA: ICD-10-CM

## 2025-05-22 DIAGNOSIS — M54.2 NECK PAIN: Primary | ICD-10-CM

## 2025-05-22 DIAGNOSIS — M54.12 CERVICAL RADICULOPATHY: ICD-10-CM

## 2025-05-22 DIAGNOSIS — M47.816 LUMBAR SPONDYLOSIS: ICD-10-CM

## 2025-05-22 DIAGNOSIS — M54.16 LUMBAR RADICULOPATHY: ICD-10-CM

## 2025-05-22 DIAGNOSIS — M54.42 CHRONIC BILATERAL LOW BACK PAIN WITH BILATERAL SCIATICA: ICD-10-CM

## 2025-05-22 DIAGNOSIS — M51.362 DEGENERATION OF INTERVERTEBRAL DISC OF LUMBAR REGION WITH DISCOGENIC BACK PAIN AND LOWER EXTREMITY PAIN: ICD-10-CM

## 2025-05-22 DIAGNOSIS — M47.812 CERVICAL SPONDYLOSIS: ICD-10-CM

## 2025-05-22 PROCEDURE — 1123F ACP DISCUSS/DSCN MKR DOCD: CPT | Performed by: NURSE PRACTITIONER

## 2025-05-22 PROCEDURE — 3017F COLORECTAL CA SCREEN DOC REV: CPT | Performed by: NURSE PRACTITIONER

## 2025-05-22 PROCEDURE — G8400 PT W/DXA NO RESULTS DOC: HCPCS | Performed by: NURSE PRACTITIONER

## 2025-05-22 PROCEDURE — 1090F PRES/ABSN URINE INCON ASSESS: CPT | Performed by: NURSE PRACTITIONER

## 2025-05-22 PROCEDURE — 1160F RVW MEDS BY RX/DR IN RCRD: CPT | Performed by: NURSE PRACTITIONER

## 2025-05-22 PROCEDURE — G8417 CALC BMI ABV UP PARAM F/U: HCPCS | Performed by: NURSE PRACTITIONER

## 2025-05-22 PROCEDURE — 99214 OFFICE O/P EST MOD 30 MIN: CPT | Performed by: NURSE PRACTITIONER

## 2025-05-22 PROCEDURE — 1036F TOBACCO NON-USER: CPT | Performed by: NURSE PRACTITIONER

## 2025-05-22 PROCEDURE — G8427 DOCREV CUR MEDS BY ELIG CLIN: HCPCS | Performed by: NURSE PRACTITIONER

## 2025-05-22 PROCEDURE — 1159F MED LIST DOCD IN RCRD: CPT | Performed by: NURSE PRACTITIONER

## 2025-05-22 PROCEDURE — 1125F AMNT PAIN NOTED PAIN PRSNT: CPT | Performed by: NURSE PRACTITIONER

## 2025-05-22 NOTE — PROGRESS NOTES
Chief complaint   Chief Complaint   Patient presents with    Neck Pain       History of Present Illness:  Emperatriz Huerta is a  70 y.o.  female   who comes in today after last being seen as a new patient November 8, 2025 for both her neck and her back.  She states she has neck pain on the left side that radiates down into her hand and in her subscapular area.  She states is been going on for 40 years.  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  She states this is no longer a Worker's Comp. case.  She has not worked in the last 15 years and states she is on Social Security disability.  She describes the pain as sharp stabbing and burning and she feels swelling in the trapezius area.  She gets numbness in her left hand and tingling in both hands..  She states the arm symptoms have gotten worse over the last year.  She is left-handed.  She states she has issues with her balance, dropping things and dexterity but her handwriting is okay.  She also complains of low back pain that started with the same incident 40 years ago and it radiates into her bilateral buttocks down to her feet.  She gets numbness in her feet.  She states the pain is sharp and stabbing also.  She has tried Tylenol and ibuprofen but cannot really take the ibuprofen because she is allergic to NSAIDs and they cause her to get hives and itch after a while.  She is on Lyrica 100 mg 3 times a day for fibromyalgia through her PCP..  She states her neck pain and arm pain are worse than the back and leg pain.    Last visit we did x-rays of both her neck and her back.  The neck demonstrates spondylosis narrowing at C6-7.  The lumbar spine demonstrates spondylosis and degenerative disc disease.  She is diabetic and states her last A1c was 6.2  She is a non-smoker.  She denies fever bowel bladder dysfunction.         Physical Exam: Patient is a 70-year-old female well-developed well-nourished who is alert and oriented with a normal

## 2025-07-10 ENCOUNTER — OFFICE VISIT (OUTPATIENT)
Age: 70
End: 2025-07-10

## 2025-07-10 VITALS
SYSTOLIC BLOOD PRESSURE: 154 MMHG | HEART RATE: 53 BPM | TEMPERATURE: 97.1 F | BODY MASS INDEX: 30.66 KG/M2 | HEIGHT: 65 IN | WEIGHT: 184 LBS | DIASTOLIC BLOOD PRESSURE: 74 MMHG

## 2025-07-10 DIAGNOSIS — M54.12 CERVICAL RADICULOPATHY: ICD-10-CM

## 2025-07-10 DIAGNOSIS — G89.29 CHRONIC BILATERAL LOW BACK PAIN WITH BILATERAL SCIATICA: ICD-10-CM

## 2025-07-10 DIAGNOSIS — M54.41 CHRONIC BILATERAL LOW BACK PAIN WITH BILATERAL SCIATICA: ICD-10-CM

## 2025-07-10 DIAGNOSIS — M47.812 CERVICAL SPONDYLOSIS: ICD-10-CM

## 2025-07-10 DIAGNOSIS — M54.2 NECK PAIN: Primary | ICD-10-CM

## 2025-07-10 DIAGNOSIS — M54.42 CHRONIC BILATERAL LOW BACK PAIN WITH BILATERAL SCIATICA: ICD-10-CM

## 2025-07-10 NOTE — PROGRESS NOTES
assist device.  She has 4 out of 5 strength bilateral upper and lower extremities.  Negative straight leg raise.  Negative Gerardo's.  She has a normal tandem gait.        Assessment and Plan: This is a patient with both neck pain that radiates into the left upper extremity and subscapular area.  She also has back pain that radiates into her bilateral buttock and down to her feet.  Her back pain has improved but she still having the neck pain and radicular pain into her arm and subscapular area. She is allergic to NSAIDs so we will stay away from that.  She is already on Lyrica we will continue that through her PCP.  I will get a MRI of her cervical spine.  She may be a candidate for injections or surgery.  She will follow-up with one of our physicians for MRI follow-up.  I will see her back in 6 weeks.  If she is not improved we will get a MRI of the cervical spine.        Emperatriz was seen today for neck pain and back pain.    Diagnoses and all orders for this visit:    Neck pain  -     MRI CERVICAL SPINE WO CONTRAST; Future    Cervical spondylosis  -     MRI CERVICAL SPINE WO CONTRAST; Future    Cervical radiculopathy  -     MRI CERVICAL SPINE WO CONTRAST; Future    Chronic bilateral low back pain with bilateral sciatica        Return in about 4 weeks (around 8/7/2025) for fu with MD for MRI review.      has been .reviewed and is appropriate    Medications:  Current Outpatient Medications   Medication Sig Dispense Refill    losartan-hydroCHLOROthiazide (HYZAAR) 100-25 MG per tablet Take 1 tablet by mouth daily 90 tablet 3    amLODIPine (NORVASC) 5 MG tablet Take 1 tablet by mouth daily 90 tablet 3    atorvastatin (LIPITOR) 20 MG tablet Take 1 tablet by mouth daily 90 tablet 3    metoprolol succinate (TOPROL XL) 50 MG extended release tablet Take 1 tablet by mouth daily 90 tablet 3    SPIRIVA HANDIHALER 18 MCG inhalation capsule Inhale 1 capsule into the lungs 2 times daily      albuterol sulfate HFA

## 2025-07-14 NOTE — MR AVS SNAPSHOT
Patient arrives ambulatory with c/o painful urination, frequent urination, hematuria, small amounts since last mid last week. Denies fevers.    Visit Information Date & Time Provider Department Dept. Phone Encounter #  
 2/1/2017 10:15 AM Albaro Dillon MD [facility] [phone] [id number] Upcoming Health Maintenance Date Due Hepatitis C Screening 1955 DTaP/Tdap/Td series (1 - Tdap) 3/23/1976 PAP AKA CERVICAL CYTOLOGY 3/23/1976 BREAST CANCER SCRN MAMMOGRAM 3/23/2005 FOBT Q 1 YEAR AGE 50-75 3/23/2005 ZOSTER VACCINE AGE 60> 3/23/2015 INFLUENZA AGE 9 TO ADULT 8/1/2016 Allergies as of 2/1/2017  Review Complete On: 2/1/2017 By: Albaro Dillon MD  
  
 Severity Noted Reaction Type Reactions Latex, Natural Rubber  10/01/2013   Side Effect Rash Latex tape causes rash to area Celebrex [Celecoxib]  09/06/2012    Hives Iodine  09/06/2012    Hives Sulfa (Sulfonamide Antibiotics)  09/06/2012    Hives Current Immunizations  Never Reviewed No immunizations on file. Not reviewed this visit You Were Diagnosed With   
  
 Codes Comments Cervical stenosis of spine    -  Primary ICD-10-CM: M48.02 
ICD-9-CM: 723.0 Vitals BP Pulse Temp Resp Height(growth percentile) Weight(growth percentile) 154/72 (!) 102 97.8 °F (36.6 °C) (Oral) 18 5' 5\" (1.651 m) 197 lb 9.6 oz (89.6 kg) SpO2 BMI OB Status Smoking Status 98% 32.88 kg/m2 Postmenopausal Never Smoker BMI and BSA Data Body Mass Index Body Surface Area  
 32.88 kg/m 2 2.03 m 2 Preferred Pharmacy Pharmacy Name Phone Count includes the [id number] [address] [phone] Your Updated Medication List  
  
   
This list is accurate as of: 2/1/17 11:28 AM.  Always use your most recent med list.  
  
  
  
  
 albuterol 90 mcg/actuation inhaler Commonly known as:  PROVENTIL HFA, VENTOLIN HFA, PROAIR HFA Take 1 Puff by inhalation every four (4) hours as needed. Azelastine 0.15 % (205.5 mcg) nasal spray Commonly known as:  ASTEPRO  
  
 betamethasone 6 mg/mL injection Commonly known as:  CELESTONE SOLUSPAN  
2 mL by IntraMUSCular route once for 1 dose. bupivacaine 0.25 % (2.5 mg/mL) Soln injection Commonly known as:  MARCAINE  
0.5 mL by SubCUTAneous route once for 1 dose. cetirizine 10 mg tablet Commonly known as:  ZYRTEC Take 10 mg by mouth daily. cloNIDine HCl 0.1 mg tablet Commonly known as:  CATAPRES Take 0.1 mg by mouth nightly. cyclobenzaprine 10 mg tablet Commonly known as:  FLEXERIL Take 1 Tab by mouth three (3) times daily as needed. * DEXILANT 60 mg Cpdb Generic drug:  Dexlansoprazole Take  by mouth daily. * DEXILANT 30 mg capsule Generic drug:  dexlansoprazole FARXIGA 5 mg Tab tablet Generic drug:  dapagliflozin Take 5 mg by mouth daily. HYDROcodone-acetaminophen 5-325 mg per tablet Commonly known as:  Funmi Oyster HYZAAR 100-25 mg per tablet Generic drug:  losartan-hydroCHLOROthiazide Take 1 Tab by mouth daily. lidocaine (PF) 20 mg/mL (2 %) injection Commonly known as:  XYLOCAINE  
0.5 mL by Other route once for 1 dose. metFORMIN 1,000 mg tablet Commonly known as:  GLUCOPHAGE Take 1,000 mg by mouth two (2) times daily (with meals). montelukast 10 mg tablet Commonly known as:  SINGULAIR Take 10 mg by mouth daily. potassium chloride SA 10 mEq capsule Commonly known as:  Laine Dage Take 10 mEq by mouth daily. pregabalin 100 mg capsule Commonly known as:  Chayito Main Take 1 Cap by mouth three (3) times daily. Max Daily Amount: 300 mg. Indications: FIBROMYALGIA  
  
 promethazine 12.5 mg tablet Commonly known as:  PHENERGAN Take 12.5 mg by mouth as needed. PROMETRIUM 100 mg capsule Generic drug:  progesterone Take 100 mg by mouth daily. sodium chloride irrigation 0.9 % irrigation SPIRIVA WITH HANDIHALER 18 mcg inhalation capsule Generic drug:  tiotropium Take 1 Cap by inhalation daily. topiramate 25 mg tablet Commonly known as:  TOPAMAX Take 1 Tab by mouth two (2) times a day. * traMADol 200 mg tablet Commonly known as:  ULTRAM-ER Take 1 Tab by mouth daily. Max Daily Amount: 200 mg.  
  
 * traMADol 50 mg tablet Commonly known as:  ULTRAM  
  
 VITAMIN D2 50,000 unit capsule Generic drug:  ergocalciferol Take 50,000 Units by mouth every seven (7) days. Vytorin 10/40 10-40 mg per tablet Generic drug:  ezetimibe-simvastatin Take 1 Tab by mouth nightly. * Notice: This list has 4 medication(s) that are the same as other medications prescribed for you. Read the directions carefully, and ask your doctor or other care provider to review them with you. Prescriptions Printed Refills  
 pregabalin (LYRICA) 100 mg capsule 2 Sig: Take 1 Cap by mouth three (3) times daily. Max Daily Amount: 300 mg. Indications: FIBROMYALGIA Class: Print Route: Oral  
  
We Performed the Following BETAMETHASONE ACETATE & SODIUM PHOSPHATE INJECTION 3 MG EA. [ [facility]] INJECTION, BUPIVICAINE HYDRO [ [facility]] LIDOCAINE INJECTION [ [facility]] NE INJECT TRIGGER POINT, 1 OR 2 [id number] CPT(R)] To-Do List   
 02/01/2017 Imaging:  MRI CERV SPINE WO CONT Referral Information Referral ID Referred By Referred To  
  
 [id number] Eric Restrepo Not Available Visits Status Start Date End Date 1 New Request 2/1/17 2/1/18 If your referral has a status of pending review or denied, additional information will be sent to support the outcome of this decision. Introducing [facility]! Jennifer Jeff introduces dotSyntax patient portal. Now you can access parts of your medical record, email your doctor's office, and request medication refills online. 1. In your internet browser, go to [email or web address] 2. Click on the First Time User? Click Here link in the Sign In box. You will see the New Member Sign Up page. 3. Enter your Tindie Access Code exactly as it appears below. You will not need to use this code after youve completed the sign-up process. If you do not sign up before the expiration date, you must request a new code. · Tindie Access Code: [email or web address] Expires: 2/20/2017 12:25 PM 
 
4. Enter the last four digits of your Social Security Number (xxxx) and Date of Birth (mm/dd/yyyy) as indicated and click Submit. You will be taken to the next sign-up page. 5. Create a Tindie ID. This will be your Tindie login ID and cannot be changed, so think of one that is secure and easy to remember. 6. Create a Tindie password. You can change your password at any time. 7. Enter your Password Reset Question and Answer. This can be used at a later time if you forget your password. 8. Enter your e-mail address. You will receive e-mail notification when new information is available in [address] 9. Click Sign Up. You can now view and download portions of your medical record. 10. Click the Download Summary menu link to download a portable copy of your medical information. If you have questions, please visit the Frequently Asked Questions section of the Tindie website. Remember, Tindie is NOT to be used for urgent needs. For medical emergencies, dial 911. Now available from your iPhone and Android! Please provide this summary of care documentation to your next provider. Your primary care clinician is listed as Aries Renee. If you have any questions after today's visit, please call [phone].

## (undated) DEVICE — STERILE POLYISOPRENE POWDER-FREE SURGICAL GLOVES: Brand: PROTEXIS

## (undated) DEVICE — BASIN EMESIS 500CC ROSE 250/CS 60/PLT: Brand: MEDEGEN MEDICAL PRODUCTS, LLC

## (undated) DEVICE — GAUZE,SPONGE,4"X4",16PLY,STRL,LF,10/TRAY: Brand: MEDLINE

## (undated) DEVICE — FCPS RAD JAW 4LC 240CM W/NDL -- BX/20 RADIAL JAW 4

## (undated) DEVICE — CATHETER SUCT TR FL TIP 14FR W/ O CTRL

## (undated) DEVICE — SYRINGE MED 25GA 3ML L5/8IN SUBQ PLAS W/ DETACH NDL SFTY

## (undated) DEVICE — MEDI-VAC NON-CONDUCTIVE SUCTION TUBING: Brand: CARDINAL HEALTH

## (undated) DEVICE — SOLUTION IRRIG 1000ML H2O STRL BLT

## (undated) DEVICE — AIRLIFE™ NASAL OXYGEN CANNULA CURVED, FLARED TIP WITH 14 FOOT (4.3 M) CRUSH-RESISTANT TUBING, OVER-THE-EAR STYLE: Brand: AIRLIFE™

## (undated) DEVICE — MEDI-VAC SUCTION HIGH CAPACITY: Brand: CARDINAL HEALTH

## (undated) DEVICE — ENDOSCOPY PUMP TUBING/ CAP SET: Brand: ERBE

## (undated) DEVICE — FLEX ADVANTAGE 3000CC: Brand: FLEX ADVANTAGE

## (undated) DEVICE — FLUFF AND POLYMER UNDERPAD,EXTRA HEAVY: Brand: WINGS

## (undated) DEVICE — BITE BLOCK ENDOSCP UNIV AD 6 TO 9.4 MM

## (undated) DEVICE — SYR 50ML SLIP TIP NSAF LF STRL --